# Patient Record
Sex: MALE | Race: WHITE | Employment: UNEMPLOYED | URBAN - METROPOLITAN AREA
[De-identification: names, ages, dates, MRNs, and addresses within clinical notes are randomized per-mention and may not be internally consistent; named-entity substitution may affect disease eponyms.]

---

## 2017-12-03 ENCOUNTER — HOSPITAL ENCOUNTER (INPATIENT)
Age: 27
LOS: 2 days | Discharge: HOME | DRG: 754 | End: 2017-12-06
Attending: EMERGENCY MEDICINE | Admitting: PSYCHIATRY & NEUROLOGY
Payer: MEDICAID

## 2017-12-03 DIAGNOSIS — F39 MOOD DISORDER (HCC): Primary | ICD-10-CM

## 2017-12-03 PROCEDURE — 99285 EMERGENCY DEPT VISIT HI MDM: CPT

## 2017-12-03 RX ORDER — CITALOPRAM 10 MG/1
TABLET ORAL DAILY
Status: ON HOLD | COMMUNITY
End: 2017-12-05 | Stop reason: HOSPADM

## 2017-12-03 ASSESSMENT — ENCOUNTER SYMPTOMS
SHORTNESS OF BREATH: 0
SORE THROAT: 0
EYE DISCHARGE: 0
NAUSEA: 0
RHINORRHEA: 0
COUGH: 0
COLOR CHANGE: 0
DIARRHEA: 0
EYE REDNESS: 0
VOMITING: 0

## 2017-12-03 ASSESSMENT — SLEEP AND FATIGUE QUESTIONNAIRES
AVERAGE NUMBER OF SLEEP HOURS: 8
DO YOU USE A SLEEP AID: NO
DO YOU HAVE DIFFICULTY SLEEPING: NO

## 2017-12-03 ASSESSMENT — LIFESTYLE VARIABLES: HISTORY_ALCOHOL_USE: NO

## 2017-12-04 PROBLEM — F14.10 COCAINE ABUSE (HCC): Status: ACTIVE | Noted: 2017-12-04

## 2017-12-04 PROBLEM — F32.A DEPRESSIVE DISORDER: Status: ACTIVE | Noted: 2017-12-04

## 2017-12-04 LAB
AMPHETAMINE SCREEN URINE: NEGATIVE
BARBITURATE SCREEN URINE: NEGATIVE
BENZODIAZEPINE SCREEN, URINE: NEGATIVE
BUPRENORPHINE URINE: ABNORMAL
CANNABINOID SCREEN URINE: NEGATIVE
COCAINE METABOLITE, URINE: POSITIVE
MDMA URINE: ABNORMAL
METHADONE SCREEN, URINE: NEGATIVE
METHAMPHETAMINE, URINE: ABNORMAL
OPIATES, URINE: NEGATIVE
OXYCODONE SCREEN URINE: NEGATIVE
PHENCYCLIDINE, URINE: NEGATIVE
PROPOXYPHENE, URINE: ABNORMAL
TEST INFORMATION: ABNORMAL
TRICYCLIC ANTIDEPRESSANTS, UR: ABNORMAL

## 2017-12-04 PROCEDURE — 6370000000 HC RX 637 (ALT 250 FOR IP): Performed by: PSYCHIATRY & NEUROLOGY

## 2017-12-04 PROCEDURE — GZ56ZZZ INDIVIDUAL PSYCHOTHERAPY, SUPPORTIVE: ICD-10-PCS | Performed by: PSYCHIATRY & NEUROLOGY

## 2017-12-04 PROCEDURE — GZHZZZZ GROUP PSYCHOTHERAPY: ICD-10-PCS | Performed by: PSYCHIATRY & NEUROLOGY

## 2017-12-04 PROCEDURE — 80307 DRUG TEST PRSMV CHEM ANLYZR: CPT

## 2017-12-04 PROCEDURE — 1240000000 HC EMOTIONAL WELLNESS R&B

## 2017-12-04 RX ORDER — TRAZODONE HYDROCHLORIDE 50 MG/1
50 TABLET ORAL NIGHTLY PRN
Status: DISCONTINUED | OUTPATIENT
Start: 2017-12-04 | End: 2017-12-06 | Stop reason: HOSPADM

## 2017-12-04 RX ORDER — HALOPERIDOL 5 MG/ML
5 INJECTION INTRAMUSCULAR 2 TIMES DAILY PRN
Status: DISCONTINUED | OUTPATIENT
Start: 2017-12-04 | End: 2017-12-06 | Stop reason: HOSPADM

## 2017-12-04 RX ORDER — HYDROXYZINE HYDROCHLORIDE 25 MG/1
50 TABLET, FILM COATED ORAL 3 TIMES DAILY PRN
Status: DISCONTINUED | OUTPATIENT
Start: 2017-12-04 | End: 2017-12-06 | Stop reason: HOSPADM

## 2017-12-04 RX ORDER — BENZTROPINE MESYLATE 1 MG/ML
2 INJECTION INTRAMUSCULAR; INTRAVENOUS DAILY PRN
Status: DISCONTINUED | OUTPATIENT
Start: 2017-12-04 | End: 2017-12-06 | Stop reason: HOSPADM

## 2017-12-04 RX ORDER — MAGNESIUM HYDROXIDE/ALUMINUM HYDROXICE/SIMETHICONE 120; 1200; 1200 MG/30ML; MG/30ML; MG/30ML
30 SUSPENSION ORAL 2 TIMES DAILY PRN
Status: DISCONTINUED | OUTPATIENT
Start: 2017-12-04 | End: 2017-12-06 | Stop reason: HOSPADM

## 2017-12-04 RX ORDER — HALOPERIDOL 5 MG
5 TABLET ORAL 2 TIMES DAILY PRN
Status: DISCONTINUED | OUTPATIENT
Start: 2017-12-04 | End: 2017-12-06 | Stop reason: HOSPADM

## 2017-12-04 RX ORDER — ACETAMINOPHEN 325 MG/1
650 TABLET ORAL EVERY 6 HOURS PRN
Status: DISCONTINUED | OUTPATIENT
Start: 2017-12-04 | End: 2017-12-06 | Stop reason: HOSPADM

## 2017-12-04 RX ORDER — CITALOPRAM 20 MG/1
20 TABLET ORAL DAILY
Status: DISCONTINUED | OUTPATIENT
Start: 2017-12-04 | End: 2017-12-06 | Stop reason: HOSPADM

## 2017-12-04 RX ADMIN — CITALOPRAM HYDROBROMIDE 20 MG: 20 TABLET ORAL at 08:55

## 2017-12-04 ASSESSMENT — SLEEP AND FATIGUE QUESTIONNAIRES
AVERAGE NUMBER OF SLEEP HOURS: 8
DO YOU USE A SLEEP AID: NO
DO YOU HAVE DIFFICULTY SLEEPING: NO

## 2017-12-04 ASSESSMENT — LIFESTYLE VARIABLES
HISTORY_ALCOHOL_USE: YES
HISTORY_ALCOHOL_USE: NO

## 2017-12-04 ASSESSMENT — PATIENT HEALTH QUESTIONNAIRE - PHQ9: SUM OF ALL RESPONSES TO PHQ QUESTIONS 1-9: 11

## 2017-12-04 NOTE — PROGRESS NOTES
Psychiatric Admission Note    Referred by ED       Chief Complaint; Increase in suicidal intentions and unable to contract for safety,    HX of present illness[de-identified]    This 32 yrs old male was admitted for inpatient psychiatric treatment referred by [facility] AN AFFILIATE OF [facility]  (ED) where pt presented with   increase in depression and suicidal ideation. Patient reports that over the last 4 months he has had multiple stressors of losing his job, breaking up with his girlfriend, using cocaine after being clean for 4 years. Patient states that he did not feel safe in returning home because he did not know if he could keep himself safe. Patient states that he has prevent himself from developing a plan. Patient admits to recently stopping steroids and testerone. Patient denies homicidal ideation or history of violence. Patient denies auditory and visual hallucination. Yovani Manzano is a 32 y.o. male who presented with general plan to harm self. . The patient also presents with feelings of being down, depressed or hopelessness. , loss of interest in usual activities. and feelings of guilt, worthlessness or loss of self confidence. mood swings, and no hallucinations were present. Allergies:  Review of patient's allergies indicates no known allergies. History of Substance Abuse:    Patient did  present with substance use, being positive for  cocaine . Past Psychiatric History:     Patient admits to past psychiatric treatment. Family History of psychiatric disorders:    Family history: negative mood disorders. Medical History:     Past Medical History:   Diagnosis Date    Concussion     Murmur         History of Psychiatric Symptoms/Review of Systems:   Teri: no   Panic attacks:  No   Phobias: No   Obsessions and/or Compulsions: No   Involuntary body movements/tics: No   Hallucinations: No   Suicidal admits to  Homicidal denies  Delusions: No   Trauma/PTSD:  No       Mental Status  Pt.  Is
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
## 2017-12-04 NOTE — CARE COORDINATION
Writer attempted to complete initial assessment with pt, however pt was sleeping and stated that she didn't want to be bothered at this time and to come back tomorrow. Writer will attempt to complete at a later time.

## 2017-12-04 NOTE — ED PROVIDER NOTES
display           LABS:  Labs Reviewed - No data to display          EMERGENCY DEPARTMENT COURSE:   Vitals:    Vitals:    12/03/17 2226   BP: (!) 136/56   Pulse: 59   Resp: 16   Temp: 98.3 °F (36.8 °C)   TempSrc: Oral   SpO2: 99%   Weight: 200 lb (90.7 kg)   Height: 6' 1\" (1.854 m)         CRITICAL CARE:      CONSULTS:  None      PROCEDURES:      FINAL IMPRESSION      1. [facility] ([facility]          DISPOSITION/PLAN   DISPOSITION Decision to Admit        PATIENT REFERRED TO:  No follow-up provider specified.     DISCHARGE MEDICATIONS:  New Prescriptions    No medications on file       (Please note that portions of this note were completed with a voice recognition program.  Efforts were made to edit the dictations but occasionally words are mis-transcribed.)    Miko Bee  Emergency Medicine                  Miko Bee MD  12/03/17 6162

## 2017-12-04 NOTE — CARE COORDINATION
BHI Biopsychosocial Assessment    Current Level of Psychosocial Functioning     Independent: x   Dependent:    Minimal Assist:      Comments: Pt states he has been off of his Celexa for three weeks because he doesn't have insurance or money to afford it. Pt states he has been feeling very depressed lately and has thought about suicide, but not acted on it. He states he wanted to get help before he did something he couldn't take back because he didn't trust himself. Psychosocial High Risk Factors (check all that apply)    Unable to obtain meds: x  Chronic illness/pain:     Substance abuse: x  Lack of Family Support:    Financial stress: x  Isolation: x  Inadequate Community Resources:    Suicide attempt(s):    Not taking medications: x   Victim of crime:    Developmental Delay:    Unable to manage personal needs:    Age 72 or older:    Homeless:    No transportation:    Readmission within 30 days:    Unemployment: x  Traumatic Event:    Comments:      Psychiatric Advanced Directives:     None    Family to Involve in Treatment:     Mother and two siblings    Sexual Orientation:     SHADI    Patient Strengths:    Stable housing, support system, linked to [facility]    Patient Barriers:     No legal income, no insurance, not medication compliant, lack of employment    CMHC/mental health history:     [facility], [address] counselor, Jeremy Barajas, and see NP    Plan of Care   medication management, group/individual therapies, family meetings, psycho -education, treatment team meetings to assist with stabilization    Initial Discharge Plan: To become stable on medication, return to mom's house and follow up with [facility] and gain employment      Clinical Summary:      Pt is a 32year old   male who presented to the ED with SI, however pt did not have a plan to harm self. Pt states that he is not medication compliant at this time. Pt is linked to [facility], [address] [place] [address] on file, and does have a CM.  Pt states that he lives with his mother, Bree Phillip, whom is supportive, MICHELLE on file. Pt states that he does not have a form of legal income and recently lost his job . Pt states that  he does have a Hx of AoD use. (no length of sobriety). Pt states that he does not have abuse issues from his past. Pt states that he does not have MI in his family. Pt states that he has no Insurance. Pt states that he has graduated from [employer] [address] and has some college. Pt states that he is currently not having AH, VH and HI, but is having SI and feels hopeless.  Pt states that he has not attempted suicide in the past.

## 2017-12-04 NOTE — BH NOTE
`[facility]  Admission Note     Admission Type:   Admission Type: Voluntary    Reason for admission:  Reason for Admission: Increased depression and suicidal ideation, off meds, abusing testosterone, adderall, cocaine, alcohol    PATIENT STRENGTHS:  Strengths: Communication, Connection to output provider    Patient Strengths and Limitations:  Limitations: Inappropriate/potentially harmful leisure interests    Addictive Behavior:   Addictive Behavior  In the past 3 months, have you felt or has someone told you that you have a problem with:  : (P) None  Do you have a history of Chemical Use?: (P) No  Do you have a history of Alcohol Use?: (P) Yes  Do you have a history of Street Drug Abuse?: (P) Yes  Histroy of Prescripton Drug Abuse?: (P) Yes    Medical Problems:   Past Medical History:   Diagnosis Date    Concussion     Murmur        Status EXAM:  Status and Exam  Normal: No  Facial Expression: Flat  Affect: Blunt  Level of Consciousness: Alert  Mood:Normal: No  Mood: Depressed, Anxious  Motor Activity:Normal: Yes  Interview Behavior: Cooperative  Preception: [place] to Person, Antonetta Shave to Time, [place] to Place, [place] to Situation  Attention:Normal: Yes  Thought Processes: Circumstantial  Thought Content:Normal: Yes  Hallucinations: None  Delusions: No  Memory:Normal: Yes  Insight and Judgment: No  Insight and Judgment: Poor Judgment, Poor Insight  Present Suicidal Ideation: Yes  Present Homicidal Ideation: No    Tobacco Screening:  Practical Counseling, on admission, juan jose X, if applicable and completed (first 3 are required if patient doesn't refuse):             ( x)  Recognizing danger situations (included triggers and roadblocks)                    ( x)  Coping skills (new ways to manage stress, exercise, relaxation techniques, changing routine, distraction)                                                           ( x)  Basic information about quitting (benefits of quitting, techniques in how to quit, available resources  ( ) Referral for counseling faxed to [facility]                                           ( x) Patient refused counseling  ( ) Patient has not smoked in the last 30 days      Pt admitted with followings belongings:  Dentures: None  Vision - Corrective Lenses: Glasses  Hearing Aid: None  Jewelry: None  Body Piercings Removed: N/A  Clothing: Footwear, Jacket / coat, Pants, Shirt, Socks  Were All Patient Medications Collected?: Not Applicable  Other Valuables: Money (Comment), Wallet     Valuables placed in safe in security envelope, number: [id number]. Patient oriented to surroundings and program expectations and copy of patient rights given. Received admission packet: yes. Consents reviewed, signed. Patient verbalized understanding: yes.     Patient education on precautions: yes                  José Miguel Bourne RN

## 2017-12-05 LAB
ABSOLUTE EOS #: 0.1 K/UL (ref 0–0.4)
ABSOLUTE IMMATURE GRANULOCYTE: ABNORMAL K/UL (ref 0–0.3)
ABSOLUTE LYMPH #: 1.9 K/UL (ref 1–4.8)
ABSOLUTE MONO #: 0.6 K/UL (ref 0.1–1.3)
ALBUMIN SERPL-MCNC: 3.4 G/DL (ref 3.5–5.2)
ALBUMIN/GLOBULIN RATIO: ABNORMAL (ref 1–2.5)
ALP BLD-CCNC: 52 U/L (ref 40–129)
ALT SERPL-CCNC: 50 U/L (ref 5–41)
ANION GAP SERPL CALCULATED.3IONS-SCNC: 7 MMOL/L (ref 9–17)
AST SERPL-CCNC: 22 U/L
BASOPHILS # BLD: 1 % (ref 0–2)
BASOPHILS ABSOLUTE: 0 K/UL (ref 0–0.2)
BILIRUB SERPL-MCNC: 0.67 MG/DL (ref 0.3–1.2)
BUN BLDV-MCNC: 14 MG/DL (ref 6–20)
BUN/CREAT BLD: ABNORMAL (ref 9–20)
CALCIUM SERPL-MCNC: 8.8 MG/DL (ref 8.6–10.4)
CHLORIDE BLD-SCNC: 105 MMOL/L (ref 98–107)
CO2: 30 MMOL/L (ref 20–31)
CREAT SERPL-MCNC: 0.98 MG/DL (ref 0.7–1.2)
DIFFERENTIAL TYPE: ABNORMAL
EOSINOPHILS RELATIVE PERCENT: 2 % (ref 0–4)
GFR AFRICAN AMERICAN: >60 ML/MIN
GFR NON-AFRICAN AMERICAN: >60 ML/MIN
GFR SERPL CREATININE-BSD FRML MDRD: ABNORMAL ML/MIN/{1.73_M2}
GFR SERPL CREATININE-BSD FRML MDRD: ABNORMAL ML/MIN/{1.73_M2}
GLUCOSE BLD-MCNC: 100 MG/DL (ref 70–99)
HCT VFR BLD CALC: 40.3 % (ref 41–53)
HEMOGLOBIN: 13.7 G/DL (ref 13.5–17.5)
IMMATURE GRANULOCYTES: ABNORMAL %
LYMPHOCYTES # BLD: 35 % (ref 24–44)
MCH RBC QN AUTO: 28.5 PG (ref 26–34)
MCHC RBC AUTO-ENTMCNC: 34.1 G/DL (ref 31–37)
MCV RBC AUTO: 83.6 FL (ref 80–100)
MONOCYTES # BLD: 10 % (ref 1–7)
PDW BLD-RTO: 13.6 % (ref 11.5–14.9)
PLATELET # BLD: 157 K/UL (ref 150–450)
PLATELET ESTIMATE: ABNORMAL
PMV BLD AUTO: 8.9 FL (ref 6–12)
POTASSIUM SERPL-SCNC: 4.2 MMOL/L (ref 3.7–5.3)
RBC # BLD: 4.82 M/UL (ref 4.5–5.9)
RBC # BLD: ABNORMAL 10*6/UL
SEG NEUTROPHILS: 52 % (ref 36–66)
SEGMENTED NEUTROPHILS ABSOLUTE COUNT: 2.9 K/UL (ref 1.3–9.1)
SODIUM BLD-SCNC: 142 MMOL/L (ref 135–144)
TOTAL PROTEIN: 4.7 G/DL (ref 6.4–8.3)
WBC # BLD: 5.4 K/UL (ref 3.5–11)
WBC # BLD: ABNORMAL 10*3/UL

## 2017-12-05 PROCEDURE — 6370000000 HC RX 637 (ALT 250 FOR IP): Performed by: PSYCHIATRY & NEUROLOGY

## 2017-12-05 PROCEDURE — 80053 COMPREHEN METABOLIC PANEL: CPT

## 2017-12-05 PROCEDURE — 85025 COMPLETE CBC W/AUTO DIFF WBC: CPT

## 2017-12-05 PROCEDURE — 36415 COLL VENOUS BLD VENIPUNCTURE: CPT

## 2017-12-05 PROCEDURE — 1240000000 HC EMOTIONAL WELLNESS R&B

## 2017-12-05 RX ORDER — CITALOPRAM 20 MG/1
20 TABLET ORAL DAILY
Qty: 30 TABLET | Refills: 0 | Status: SHIPPED | OUTPATIENT
Start: 2017-12-05 | End: 2018-07-09

## 2017-12-05 RX ORDER — CITALOPRAM 20 MG/1
20 TABLET ORAL DAILY
Qty: 30 TABLET | Refills: 0 | Status: SHIPPED | OUTPATIENT
Start: 2017-12-05 | End: 2017-12-05

## 2017-12-05 RX ADMIN — CITALOPRAM HYDROBROMIDE 20 MG: 20 TABLET ORAL at 10:01

## 2017-12-05 NOTE — H&P
HISTORY and Treinta LEE ANN Andrea [address]       NAME:  Liban Crow  MRN: [id number]   YOB: 1990   Date: 12/5/2017   Age: 32 y.o. Gender: male     COMPLAINT AND PRESENT HISTORY:      Liban Crow is 32 y.o.,  male, admitted because of depression. Pt has suicidal ideation, denies any plans. Pt denies any homicidal ideation. Pt denies a history of previous suicide attempts. Pt feels hopeless, helpless, worthless, lack of interest, loss of energy. Poor insight. Pt denies having poor sleep, loss of appetite, poor concentration and memory. Pt denies any current auditory, visual or tactile hallucinations. Patients current stressors include housing problems, being evicted, relationship problems, brake-up with girlfriend. Pt has substance abuse, cocaine use daily. Patient denies any current alcohol abuse. Patient will being living at [facility]. Pt has been non compliant with his medications for 3 weeks. DIAGNOSTIC RESULTS   Labs:  CBC:   Recent Labs      12/05/17   0730   WBC  5.4   HGB  13.7   PLT  157     BMP:    Recent Labs      12/05/17   0730   NA  142   K  4.2   CL  105   CO2  30   BUN  14   CREATININE  0.98   GLUCOSE  100*     Hepatic:   Recent Labs      12/05/17   0730   AST  22   ALT  50*   BILITOT  0.67   ALKPHOS  52     PAST MEDICAL HISTORY     Past Medical History:   Diagnosis Date    Concussion     Murmur        Pt denies any history of Diabetes mellitus type 2, hypertension, stroke, heart disease, COPD, Asthma, GERD, HLD, Cancer, Seizures,Thyroid disease, Kidney Disease, Hepatitis, TB.    SURGICAL HISTORY     History reviewed. No pertinent surgical history. FAMILY HISTORY     History reviewed. No pertinent family history.     SOCIAL HISTORY       Social History     Social History    Marital status: Single     Spouse name: N/A    Number of children: N/A    Years of education: N/A     Social History Main Topics    Smoking status: Current Some Day Smoker    Smokeless tobacco: Never Used    Alcohol use Yes      Comment: social     Drug use:      Types: Cocaine      Comment: daily    Sexual activity: Not Asked     Other Topics Concern    None     Social History Narrative    None           REVIEW OF SYSTEMS      No Known Allergies    No current facility-administered medications on file prior to encounter. No current outpatient prescriptions on file prior to encounter. General health:  Fairly good. No fever or chills. Skin:  No itching, redness or rash. Head, eyes, ears, nose, throat:  No headache, epistaxis, rhinorrhea hearing loss or sore throat. Neck:  No pain, stiffness or masses. Cardiovascular/Respiratory system:  No chest pain, palpitation, shortness of breath, coughing or expectoration. Gastrointestinal tract: No abdominal pain, nausea, vomiting, diarrhea or constipation. Genitourinary:  No burning on micturition. No hesitancy, urgency, frequency or discoloration of urine. Locomotor:  No bone or joint pains. No swelling or deformities. Neuropsychiatric:  See HPI. GENERAL PHYSICAL EXAM:     Vitals: /61   Pulse (!) 46   Temp 97.5 °F (36.4 °C) (Oral)   Resp 14   Ht 6' 1\" (1.854 m)   Wt 200 lb (90.7 kg)   SpO2 100%   BMI 26.39 kg/m²  Body mass index is 26.39 kg/m². Pt was examined with a nurse present in the room. GENERAL APPEARANCE:  Liban Crow is 32 y.o.,  male, mildly obese, nourished, conscious, alert. Does not appear to be distress or pain at this time. SKIN:  Warm, dry, no cyanosis or jaundice. HEAD:  Normocephalic, atraumatic, no swelling or tenderness. Opening and closing mouth (CN V). EYES:  Pupils equal, reactive to light, Conjunctiva is clear, EOMs intact [place] (CN II, III, IV, VI), eyelids WNL. EARS:  No discharge, no marked hearing loss (CN VIII).      NOSE:  No rhinorrhea, epistaxis or septal deformity. THROAT:  Not congested. No ulceration bleeding or discharge. NECK:  No stiffness, trachea central.  No palpable masses or L.N.      CHEST:  Symmetrical and equal on expansion. HEART:  Bradycardiac, regular rhythm. S1 > S2, No audible murmurs or gallops. LUNGS:  Equal on expansion, normal breath sounds. No adventitious sounds. ABDOMEN:  Obese. Soft on palpation. No localized tenderness. No guarding or rigidity. No palpable organomegaly. LYMPHATICS:  No palpable lymphadenopathy. LOCOMOTOR, BACK AND SPINE:  No tenderness or deformities. Pt able to rotate head and shrug shoulders (CN XI). EXTREMITIES:  Symmetrical, no pedal edema. Mary Beths sign negative. No discoloration or ulcerations. NEUROLOGIC:  The patient is conscious, alert, oriented, Gait and balance WNL. No apparent focal sensory deficits. No motor deficits, muscle strength equal Oumar. No facial droop (CN VII), tongue protrudes centrally (CN XII), no slurring of the speech (CN X). CN I and IX not tested.             PROVISIONAL DIAGNOSES:      Principal Problem:    Depressive disorder  Active Problems:    Cocaine abuse        Chelsi Smith PA-C on 12/5/2017 at 4:06 PM

## 2017-12-06 VITALS
OXYGEN SATURATION: 100 % | HEIGHT: 73 IN | SYSTOLIC BLOOD PRESSURE: 117 MMHG | TEMPERATURE: 97.8 F | HEART RATE: 49 BPM | BODY MASS INDEX: 26.51 KG/M2 | RESPIRATION RATE: 14 BRPM | DIASTOLIC BLOOD PRESSURE: 70 MMHG | WEIGHT: 200 LBS

## 2017-12-06 PROCEDURE — 6370000000 HC RX 637 (ALT 250 FOR IP): Performed by: PSYCHIATRY & NEUROLOGY

## 2017-12-06 PROCEDURE — 5130000000 HC BRIDGE APPOINTMENT: Performed by: COUNSELOR

## 2017-12-06 RX ADMIN — CITALOPRAM HYDROBROMIDE 20 MG: 20 TABLET ORAL at 08:13

## 2017-12-06 NOTE — CARE COORDINATION
Bridge Appointment completed: Reviewed Discharge Instructions with patient. Patient verbalizes understanding and agreement with the discharge plan using the teachback method.        Discharge Arrangements: to jail house follow up with Roxy Luu notified: n/a PO notified via patient request  Discharge [address] [place] [address], [address] [place], [place] [id number] [phone]  Transported by:  cab

## 2017-12-06 NOTE — PLAN OF CARE
Problem: Altered Mood, Depressive Behavior  Goal: LTG-Able to verbalize and/or display a decrease in depressive symptoms  Outcome: Ongoing  PSYCHOEDUCATION GROUP NOTE    Date: 12/6/17  Start Time: 0900  End Time: 0930    Number Participants in Group:  9/16    Goal:  Patient will demonstrate increased interpersonal interaction   Topic: [employer] and goal setting    Discipline Responsible:   OT  AT  [facility]. x RT MHP Other       Participation Level:     None  Minimal    Active Listener x Interactive    Monopolizing         Participation Quality:  x Appropriate  Inappropriate   x       Attentive        Intrusive   x       Sharing        Resistant          Supportive        Lethargic       Affective:   x Congruent  Incongruent  Blunted  Flat    Constricted  Anxious  Elated  Angry    Labile  Depressed  Other         Cognitive:  x Alert x Oriented PPTP     Concentration  G x F  P   Attention Span  G x F  P   Short-Term Memory  G x F  P   Long-Term Memory  G x F  P   ProblemSolving/  Decision Making  G x F  P   Ability to Process  Information  G x F  P      Contributing Factors             Delusional             Hallucinating             Flight of Ideas             Other:       Modes of Intervention:  x Education x Support x Exploration    Clarifying  Problem Solving  Confrontation   x Socialization x Limit Setting  Reality Testing   x Activity  Movement  Media    Other:            Response to Learning:  x Able to verbalize current knowledge/experience   x Able to verbalize/acknowledge new learning   x Able to retain information   x Capable of insight   x Able to change behavior   x Progressing to goal    Other:        Comments: Pt attended group and participated.
Problem: Altered Mood, Depressive Behavior  Goal: LTG-Able to verbalize and/or display a decrease in depressive symptoms  Outcome: Ongoing  Pt admits to SI no plan, pt is isolative to room for long periods of time. Pt does not attend groups at this time. Pt is flat and aloof of peers. Pt comes out for meals and needs only. Goal: STG-Able to verbalize suicidal ideations  Outcome: Ongoing  Pt admits to having thoughts of self harm with no plan. Agreeable to seeking out staff should feelings increase. Able to identify positive coping skills and plan for safety. Will cont to monitor q15 minutes for safety and provide support and reassurance as needed.
Problem: Altered Mood, Depressive Behavior  Goal: LTG-Able to verbalize and/or display a decrease in depressive symptoms  Outcome: Ongoing  Pt denies depression at this time and states readiness for discharge tomorrow  Goal: STG-Able to verbalize suicidal ideations  Outcome: Met This Shift  Pt denies SI at this time. Pt agrees to seek out staff if they begin having SI or need to talk.  Q15min safety checks continue
Problem: Altered Mood, Depressive Behavior  Goal: LTG-Able to verbalize and/or display a decrease in depressive symptoms  Outcome: Ongoing  Pt denies depression, states readiness for discharge Wednesday. Goal: STG-Able to verbalize suicidal ideations  Outcome: Ongoing  Pt denies SI at this time. Pt agrees to seek out staff if they begin having SI or need to talk.  Q15min safety checks continue
Problem: Altered Mood, Depressive Behavior  Goal: LTG-Able to verbalize and/or display a decrease in depressive symptoms  Outcome: Ongoing  Pt did not attend Community Meeting at 0900 d/t resting in room despite staff invitation to attend.
Problem: Altered Mood, Depressive Behavior  Goal: LTG-Able to verbalize and/or display a decrease in depressive symptoms  Outcome: Ongoing  Pt did not attend Therapeutic Recreation at 1100 d/t resting in room despite staff invitation to attend.
Problem: Altered Mood, Depressive Behavior  Goal: LTG-Able to verbalize and/or display a decrease in depressive symptoms  Outcome: Ongoing  Pt did not attend Therapeutic Recreation at 1100 d/t resting in room despite staff invitation to attend.
Problem: Altered Mood, Depressive Behavior  Goal: LTG-Able to verbalize and/or display a decrease in depressive symptoms  Outcome: Ongoing  Pt did not attend Therapeutic Recreation at 1330 d/t resting in room despite staff invitation to attend.
Problem: Altered Mood, Depressive Behavior  Goal: LTG-Able to verbalize and/or display a decrease in depressive symptoms  Outcome: Ongoing  Pt did not attend socialization and self-reflection group at 1330 despite staff invitation to attend.
Problem: Altered Mood, Depressive Behavior  Goal: LTG-Able to verbalize and/or display a decrease in depressive symptoms  Outcome: Ongoing  Pt did not participate in community meeting/ goals group at 0900 despite staff encouragement to attend.
Problem: Altered Mood, Depressive Behavior  Goal: LTG-Able to verbalize and/or display a decrease in depressive symptoms  Outcome: Ongoing  The patient denies any depression issues during 1:1 conversation. The patient states he feels ready for his coming discharge and feels he will be safe thereupon. Goal: STG-Able to verbalize suicidal ideations  Outcome: Ongoing  The patient denies any thoughts of self harm or suicidal ideation. No such behaviors noted. 15 minute checks maintained.
Problem: Altered Mood, Depressive Behavior  Goal: STG-Able to verbalize suicidal ideations  Outcome: Ongoing  Pt denies thoughts of self harm and is agreeable to seeking out should thoughts of self harm arise. Safe environment maintained. Q15 minute checks for safety cont per unit policy. Will cont to monitor for safety and provides support and reassurance as needed.
Problem: Altered Mood, Depressive Behavior  Intervention: Group therapy participation per treatment plan to increase activity level  Pt declined to attend psychotherapy at 1000 am despite encouragement.   Pt offered 1:1 and refused
Problem: Altered Mood, Depressive Behavior  Intervention: Group therapy participation per treatment plan to increase activity level  Pt declined to attend psychotherapy at 1000 am despite encouragement.   Pt offered 1:1 and refused patient remains isolative to room and aloof from peers
planning    Outcome: needs reinforcement    PATIENT GOALS: to be discussed with patient within 72 hours    PLAN/TREATMENT RECOMMENDATIONS:     continue group therapy , medications compliance, goal setting, individualized assessments and care, continue to monitor pt on unit      SHORT-TERM GOALS:   Time frame for Short-Term Goals: 5-7 days    LONG-TERM GOALS:  Time frame for Long-Term Goals: 6 months  Members Present in Team Meeting: See Arron 20, CTRS  Goal: STG-Able to verbalize suicidal ideations  Outcome: Ongoing

## 2017-12-06 NOTE — PROGRESS NOTES
[facility]  Discharge Note    Pt discharged with followings belongings:   Dentures: None  Vision - Corrective Lenses: Glasses  Hearing Aid: None  Jewelry: None  Body Piercings Removed: N/A  Clothing: Footwear, Jacket / coat, Pants, Shirt, Socks  Were All Patient Medications Collected?: Not Applicable  Other Valuables: Money (Comment), Wallet   Valuables sent home with patient. Valuables retrieved from safe, Security envelope number:  [id number] and returned to patient. Patient left department with Departure Mode: By self, In cab via Mobility at Departure: Ambulatory, discharged to Discharged to: [facility] Rehab.  Patient education on aftercare instructions:   Information faxed to [facility] by [facility] RN Patient verbalize understanding of AVS: States he feels ready for discharge,denies thoughts of suicide,agrees to take medication as prescribed,and keep appointments    Status EXAM upon discharge:  Status and Exam  Normal: Yes  Facial Expression: Brightened  Affect: Appropriate  Level of Consciousness: Alert  Mood:Normal: Yes  Mood: Anxious  Motor Activity:Normal: Yes  Motor Activity: Decreased  Interview Behavior: Cooperative  Preception: [place] to Person, [place] to Time, [place] to Place, [place] to Situation  Attention:Normal: Yes  Thought Processes: Circumstantial  Thought Content:Normal: Yes  Hallucinations: None  Delusions: No  Memory:Normal: Yes  Memory: Poor Recent  Insight and Judgment: Yes  Insight and Judgment: Poor Insight  Present Suicidal Ideation: No  Present Homicidal Ideation: No    Tera Pope RN

## 2017-12-06 NOTE — PROGRESS NOTES
Patient given tobacco quitline number 63934705377 at this time, refusing to call at this time, states \" I just dont want to quit now\"- patient given information as to the dangers of long term tobacco use. Continue to reinforce the importance of tobacco cessation.

## 2017-12-20 NOTE — DISCHARGE SUMMARY
Medications      You can get these medications from any pharmacy    Bring a paper prescription for each of these medications  · citalopram 20 MG tablet             Follow Up: Follow up with [facility]/PCP/PSYCHIATRIST within 2 weeks. Sanjeev Duran MD  [address] [address] [phone]          [address] [address]  [phone]  Go on 12/8/2017  Doug @ 9:00 am and 10:30 am.    [address] [facility] [address]  [phone]  Go on 12/18/2017  Allison Crenshaw NP @ 9:00 am for medication management.       Aliya Van MD  Psychiatrist.

## 2018-07-09 ENCOUNTER — OFFICE VISIT (OUTPATIENT)
Dept: FAMILY MEDICINE CLINIC | Age: 28
End: 2018-07-09
Payer: COMMERCIAL

## 2018-07-09 VITALS
DIASTOLIC BLOOD PRESSURE: 74 MMHG | SYSTOLIC BLOOD PRESSURE: 138 MMHG | OXYGEN SATURATION: 95 % | BODY MASS INDEX: 27.35 KG/M2 | HEART RATE: 67 BPM | WEIGHT: 206.4 LBS | HEIGHT: 73 IN

## 2018-07-09 DIAGNOSIS — R73.9 HYPERGLYCEMIA: Primary | ICD-10-CM

## 2018-07-09 DIAGNOSIS — Z00.00 PREVENTATIVE HEALTH CARE: ICD-10-CM

## 2018-07-09 LAB — HBA1C MFR BLD: 5 %

## 2018-07-09 PROCEDURE — 83036 HEMOGLOBIN GLYCOSYLATED A1C: CPT | Performed by: NURSE PRACTITIONER

## 2018-07-09 PROCEDURE — 99203 OFFICE O/P NEW LOW 30 MIN: CPT | Performed by: NURSE PRACTITIONER

## 2018-07-09 ASSESSMENT — PATIENT HEALTH QUESTIONNAIRE - PHQ9
2. FEELING DOWN, DEPRESSED OR HOPELESS: 0
SUM OF ALL RESPONSES TO PHQ QUESTIONS 1-9: 0
1. LITTLE INTEREST OR PLEASURE IN DOING THINGS: 0
SUM OF ALL RESPONSES TO PHQ9 QUESTIONS 1 & 2: 0

## 2018-07-09 ASSESSMENT — ENCOUNTER SYMPTOMS
ABDOMINAL PAIN: 0
EYE DISCHARGE: 0
SHORTNESS OF BREATH: 0
BLOOD IN STOOL: 0
COUGH: 0

## 2018-07-09 ASSESSMENT — VISUAL ACUITY
OD_CC: 20/10
OS_CC: 20/10

## 2018-07-09 NOTE — PROGRESS NOTES
Visit Information    Have you changed or started any medications since your last visit including any over-the-counter medicines, vitamins, or herbal medicines? no   Are you having any side effects from any of your medications? -  no  Have you stopped taking any of your medications? Is so, why? -  no    Have you seen any other physician or provider since your last visit? No  Have you had any other diagnostic tests since your last visit? No  Have you been seen in the emergency room and/or had an admission to a hospital since we last saw you? No  Have you had your routine dental cleaning in the past 6 months? no    Have you activated your [employer] account? If not, what are your barriers?  Yes     Patient Care Team:  Purnima Arana MD as PCP - General    Medical History Review  Past Medical, Family, and Social History reviewed and does contribute to the patient presenting condition    Health Maintenance   Topic Date Due    HIV screen  01/06/2003    DTaP/Tdap/Td vaccine (1 - Tdap) 01/06/2007    Pneumococcal med risk (1 of 1 - PPSV23) 01/06/2007    Flu vaccine (1) 09/01/2018
Psychiatric/Behavioral: Negative for hallucinations and suicidal ideas. Used adderall only when studying        Objective:     Physical Exam   Constitutional: He is oriented to person, place, and time. He appears well-developed and well-nourished. HENT:   Head: Normocephalic and atraumatic. Eyes: Conjunctivae and EOM are normal.   Neck: Normal range of motion. Neck supple. Carotid bruit is not present. No tracheal deviation present. No thyromegaly present. Cardiovascular: Normal rate, regular rhythm and normal heart sounds. Pulses:       Carotid pulses are 2+ on the right side, and 2+ on the left side. Radial pulses are 2+ on the right side, and 2+ on the left side. Dorsalis pedis pulses are 2+ on the right side, and 2+ on the left side. Pulmonary/Chest: Effort normal and breath sounds normal.   Abdominal: Soft. Bowel sounds are normal.   Lymphadenopathy:     He has no cervical adenopathy. Neurological: He is alert and oriented to person, place, and time. Skin: Skin is warm and dry. Psychiatric: He has a normal mood and affect. Nursing note and vitals reviewed.     /74   Pulse 67   Ht 6' 1\" (1.854 m)   Wt 206 lb 6.4 oz (93.6 kg)   SpO2 95% Comment: rest @ RA  BMI 27.23 kg/m²     CBC:   Lab Results   Component Value Date    WBC 5.4 12/05/2017    RBC 4.82 12/05/2017    HGB 13.7 12/05/2017    HCT 40.3 12/05/2017    MCV 83.6 12/05/2017    MCH 28.5 12/05/2017    MCHC 34.1 12/05/2017    RDW 13.6 12/05/2017     12/05/2017    MPV 8.9 12/05/2017     CMP:    Lab Results   Component Value Date     12/05/2017    K 4.2 12/05/2017     12/05/2017    CO2 30 12/05/2017    BUN 14 12/05/2017    CREATININE 0.98 12/05/2017    GFRAA >60 12/05/2017    LABGLOM >60 12/05/2017    GLUCOSE 100 12/05/2017    PROT 4.7 12/05/2017    LABALBU 3.4 12/05/2017    CALCIUM 8.8 12/05/2017    BILITOT 0.67 12/05/2017    ALKPHOS 52 12/05/2017    AST 22 12/05/2017    ALT 50 12/05/2017

## 2018-07-16 ENCOUNTER — HOSPITAL ENCOUNTER (OUTPATIENT)
Age: 28
Discharge: HOME | End: 2018-07-16
Payer: COMMERCIAL

## 2018-07-16 DIAGNOSIS — Z00.00 PREVENTATIVE HEALTH CARE: ICD-10-CM

## 2018-07-16 PROBLEM — R74.8 ELEVATED LIVER ENZYMES: Status: ACTIVE | Noted: 2018-07-16

## 2018-07-16 PROBLEM — R79.9 ELEVATED BUN: Status: ACTIVE | Noted: 2018-07-16

## 2018-07-16 LAB
ALBUMIN SERPL-MCNC: 4.4 G/DL (ref 3.5–5.2)
ALBUMIN/GLOBULIN RATIO: ABNORMAL (ref 1–2.5)
ALP BLD-CCNC: 78 U/L (ref 40–129)
ALT SERPL-CCNC: 58 U/L (ref 5–41)
ANION GAP SERPL CALCULATED.3IONS-SCNC: 13 MMOL/L (ref 9–17)
AST SERPL-CCNC: 44 U/L
BILIRUB SERPL-MCNC: 0.48 MG/DL (ref 0.3–1.2)
BUN BLDV-MCNC: 25 MG/DL (ref 6–20)
BUN/CREAT BLD: ABNORMAL (ref 9–20)
CALCIUM SERPL-MCNC: 9.4 MG/DL (ref 8.6–10.4)
CHLORIDE BLD-SCNC: 103 MMOL/L (ref 98–107)
CHOLESTEROL, FASTING: 147 MG/DL
CHOLESTEROL/HDL RATIO: 7.4
CO2: 25 MMOL/L (ref 20–31)
CREAT SERPL-MCNC: 0.91 MG/DL (ref 0.7–1.2)
GFR AFRICAN AMERICAN: >60 ML/MIN
GFR NON-AFRICAN AMERICAN: >60 ML/MIN
GFR SERPL CREATININE-BSD FRML MDRD: ABNORMAL ML/MIN/{1.73_M2}
GFR SERPL CREATININE-BSD FRML MDRD: ABNORMAL ML/MIN/{1.73_M2}
GLUCOSE BLD-MCNC: 99 MG/DL (ref 70–99)
HDLC SERPL-MCNC: 20 MG/DL
HIV AG/AB: NONREACTIVE
LDL CHOLESTEROL: 115 MG/DL (ref 0–130)
POTASSIUM SERPL-SCNC: 4.5 MMOL/L (ref 3.7–5.3)
SODIUM BLD-SCNC: 141 MMOL/L (ref 135–144)
TOTAL PROTEIN: 6.8 G/DL (ref 6.4–8.3)
TRIGLYCERIDE, FASTING: 61 MG/DL
VLDLC SERPL CALC-MCNC: ABNORMAL MG/DL (ref 1–30)

## 2018-07-16 PROCEDURE — 36415 COLL VENOUS BLD VENIPUNCTURE: CPT

## 2018-07-16 PROCEDURE — 87389 HIV-1 AG W/HIV-1&-2 AB AG IA: CPT

## 2018-07-16 PROCEDURE — 80061 LIPID PANEL: CPT

## 2018-07-16 PROCEDURE — 80053 COMPREHEN METABOLIC PANEL: CPT

## 2019-04-09 ENCOUNTER — APPOINTMENT (OUTPATIENT)
Dept: GENERAL RADIOLOGY | Age: 29
End: 2019-04-09
Payer: MEDICARE

## 2019-04-09 ENCOUNTER — HOSPITAL ENCOUNTER (OUTPATIENT)
Age: 29
Setting detail: OBSERVATION
Discharge: HOME | End: 2019-04-10
Attending: EMERGENCY MEDICINE | Admitting: INTERNAL MEDICINE
Payer: MEDICARE

## 2019-04-09 DIAGNOSIS — R77.8 ELEVATED TROPONIN: ICD-10-CM

## 2019-04-09 DIAGNOSIS — R79.89 ELEVATED LFTS: ICD-10-CM

## 2019-04-09 DIAGNOSIS — R45.851 SUICIDAL IDEATION: Primary | ICD-10-CM

## 2019-04-09 DIAGNOSIS — F14.10 COCAINE ABUSE (HCC): ICD-10-CM

## 2019-04-09 LAB
ABSOLUTE EOS #: 0 K/UL (ref 0–0.4)
ABSOLUTE IMMATURE GRANULOCYTE: ABNORMAL K/UL (ref 0–0.3)
ABSOLUTE LYMPH #: 1.4 K/UL (ref 1–4.8)
ABSOLUTE MONO #: 0.9 K/UL (ref 0.1–1.3)
ACETAMINOPHEN LEVEL: <5 UG/ML (ref 10–30)
ALBUMIN SERPL-MCNC: 4.4 G/DL (ref 3.5–5.2)
ALBUMIN/GLOBULIN RATIO: ABNORMAL (ref 1–2.5)
ALP BLD-CCNC: 45 U/L (ref 40–129)
ALT SERPL-CCNC: 125 U/L (ref 5–41)
AMPHETAMINE SCREEN URINE: NEGATIVE
ANION GAP SERPL CALCULATED.3IONS-SCNC: 14 MMOL/L (ref 9–17)
AST SERPL-CCNC: 240 U/L
BARBITURATE SCREEN URINE: NEGATIVE
BASOPHILS # BLD: 2 % (ref 0–2)
BASOPHILS ABSOLUTE: 0.1 K/UL (ref 0–0.2)
BENZODIAZEPINE SCREEN, URINE: NEGATIVE
BILIRUB SERPL-MCNC: 0.84 MG/DL (ref 0.3–1.2)
BUN BLDV-MCNC: 21 MG/DL (ref 6–20)
BUN/CREAT BLD: ABNORMAL (ref 9–20)
BUPRENORPHINE URINE: ABNORMAL
CALCIUM SERPL-MCNC: 8.9 MG/DL (ref 8.6–10.4)
CANNABINOID SCREEN URINE: NEGATIVE
CHLORIDE BLD-SCNC: 99 MMOL/L (ref 98–107)
CO2: 26 MMOL/L (ref 20–31)
COCAINE METABOLITE, URINE: POSITIVE
CREAT SERPL-MCNC: 0.85 MG/DL (ref 0.7–1.2)
DIFFERENTIAL TYPE: ABNORMAL
EOSINOPHILS RELATIVE PERCENT: 0 % (ref 0–4)
ETHANOL PERCENT: <0.01 %
ETHANOL: <10 MG/DL
GFR AFRICAN AMERICAN: >60 ML/MIN
GFR NON-AFRICAN AMERICAN: >60 ML/MIN
GFR SERPL CREATININE-BSD FRML MDRD: ABNORMAL ML/MIN/{1.73_M2}
GFR SERPL CREATININE-BSD FRML MDRD: ABNORMAL ML/MIN/{1.73_M2}
GLUCOSE BLD-MCNC: 108 MG/DL (ref 70–99)
HCT VFR BLD CALC: 47.5 % (ref 41–53)
HEMOGLOBIN: 16 G/DL (ref 13.5–17.5)
IMMATURE GRANULOCYTES: ABNORMAL %
LYMPHOCYTES # BLD: 17 % (ref 24–44)
MCH RBC QN AUTO: 26.9 PG (ref 26–34)
MCHC RBC AUTO-ENTMCNC: 33.7 G/DL (ref 31–37)
MCV RBC AUTO: 79.9 FL (ref 80–100)
MDMA URINE: ABNORMAL
METHADONE SCREEN, URINE: NEGATIVE
METHAMPHETAMINE, URINE: ABNORMAL
MONOCYTES # BLD: 11 % (ref 1–7)
NRBC AUTOMATED: ABNORMAL PER 100 WBC
OPIATES, URINE: NEGATIVE
OXYCODONE SCREEN URINE: NEGATIVE
PDW BLD-RTO: 17.3 % (ref 11.5–14.9)
PHENCYCLIDINE, URINE: NEGATIVE
PLATELET # BLD: 260 K/UL (ref 150–450)
PLATELET ESTIMATE: ABNORMAL
PMV BLD AUTO: 8.7 FL (ref 6–12)
POTASSIUM SERPL-SCNC: 4.3 MMOL/L (ref 3.7–5.3)
PROPOXYPHENE, URINE: ABNORMAL
RBC # BLD: 5.95 M/UL (ref 4.5–5.9)
RBC # BLD: ABNORMAL 10*6/UL
SALICYLATE LEVEL: <1 MG/DL (ref 3–10)
SEG NEUTROPHILS: 70 % (ref 36–66)
SEGMENTED NEUTROPHILS ABSOLUTE COUNT: 5.7 K/UL (ref 1.3–9.1)
SODIUM BLD-SCNC: 139 MMOL/L (ref 135–144)
TEST INFORMATION: ABNORMAL
TOTAL PROTEIN: 6.5 G/DL (ref 6.4–8.3)
TRICYCLIC ANTIDEPRESSANTS, UR: ABNORMAL
TROPONIN INTERP: ABNORMAL
TROPONIN INTERP: ABNORMAL
TROPONIN T: ABNORMAL NG/ML
TROPONIN T: ABNORMAL NG/ML
TROPONIN, HIGH SENSITIVITY: 33 NG/L (ref 0–22)
TROPONIN, HIGH SENSITIVITY: 33 NG/L (ref 0–22)
WBC # BLD: 8.2 K/UL (ref 3.5–11)
WBC # BLD: ABNORMAL 10*3/UL

## 2019-04-09 PROCEDURE — 80053 COMPREHEN METABOLIC PANEL: CPT

## 2019-04-09 PROCEDURE — 80307 DRUG TEST PRSMV CHEM ANLYZR: CPT

## 2019-04-09 PROCEDURE — 71045 X-RAY EXAM CHEST 1 VIEW: CPT

## 2019-04-09 PROCEDURE — 99285 EMERGENCY DEPT VISIT HI MDM: CPT

## 2019-04-09 PROCEDURE — 85025 COMPLETE CBC W/AUTO DIFF WBC: CPT

## 2019-04-09 PROCEDURE — 84484 ASSAY OF TROPONIN QUANT: CPT

## 2019-04-09 PROCEDURE — 36415 COLL VENOUS BLD VENIPUNCTURE: CPT

## 2019-04-09 PROCEDURE — 6370000000 HC RX 637 (ALT 250 FOR IP): Performed by: EMERGENCY MEDICINE

## 2019-04-09 PROCEDURE — G0480 DRUG TEST DEF 1-7 CLASSES: HCPCS

## 2019-04-09 PROCEDURE — G0378 HOSPITAL OBSERVATION PER HR: HCPCS

## 2019-04-09 RX ORDER — SODIUM CHLORIDE 0.9 % (FLUSH) 0.9 %
10 SYRINGE (ML) INJECTION PRN
Status: DISCONTINUED | OUTPATIENT
Start: 2019-04-09 | End: 2019-04-10 | Stop reason: HOSPADM

## 2019-04-09 RX ORDER — LORAZEPAM 1 MG/1
1 TABLET ORAL ONCE
Status: COMPLETED | OUTPATIENT
Start: 2019-04-09 | End: 2019-04-09

## 2019-04-09 RX ORDER — ASPIRIN 81 MG/1
324 TABLET, CHEWABLE ORAL ONCE
Status: COMPLETED | OUTPATIENT
Start: 2019-04-09 | End: 2019-04-09

## 2019-04-09 RX ORDER — SODIUM CHLORIDE 0.9 % (FLUSH) 0.9 %
10 SYRINGE (ML) INJECTION EVERY 12 HOURS SCHEDULED
Status: DISCONTINUED | OUTPATIENT
Start: 2019-04-09 | End: 2019-04-10 | Stop reason: HOSPADM

## 2019-04-09 RX ORDER — SODIUM CHLORIDE 9 MG/ML
INJECTION, SOLUTION INTRAVENOUS CONTINUOUS
Status: DISCONTINUED | OUTPATIENT
Start: 2019-04-09 | End: 2019-04-10 | Stop reason: HOSPADM

## 2019-04-09 RX ORDER — ONDANSETRON 2 MG/ML
4 INJECTION INTRAMUSCULAR; INTRAVENOUS EVERY 6 HOURS PRN
Status: DISCONTINUED | OUTPATIENT
Start: 2019-04-09 | End: 2019-04-10 | Stop reason: HOSPADM

## 2019-04-09 RX ADMIN — LORAZEPAM 1 MG: 1 TABLET ORAL at 20:22

## 2019-04-09 RX ADMIN — ASPIRIN 81 MG 324 MG: 81 TABLET ORAL at 21:28

## 2019-04-09 ASSESSMENT — ENCOUNTER SYMPTOMS
TROUBLE SWALLOWING: 0
COUGH: 0
CONSTIPATION: 0
SORE THROAT: 0
DIARRHEA: 0
COLOR CHANGE: 0
BACK PAIN: 0
SHORTNESS OF BREATH: 0
VOMITING: 0
NAUSEA: 0
ABDOMINAL PAIN: 0
BLOOD IN STOOL: 0

## 2019-04-10 ENCOUNTER — APPOINTMENT (OUTPATIENT)
Dept: ULTRASOUND IMAGING | Age: 29
End: 2019-04-10
Payer: MEDICARE

## 2019-04-10 VITALS
WEIGHT: 200 LBS | HEIGHT: 73 IN | TEMPERATURE: 97.5 F | SYSTOLIC BLOOD PRESSURE: 121 MMHG | DIASTOLIC BLOOD PRESSURE: 57 MMHG | BODY MASS INDEX: 26.51 KG/M2 | HEART RATE: 92 BPM | RESPIRATION RATE: 16 BRPM | OXYGEN SATURATION: 98 %

## 2019-04-10 PROCEDURE — G0378 HOSPITAL OBSERVATION PER HR: HCPCS

## 2019-04-10 PROCEDURE — 90792 PSYCH DIAG EVAL W/MED SRVCS: CPT | Performed by: NURSE PRACTITIONER

## 2019-04-10 PROCEDURE — 99220 PR INITIAL OBSERVATION CARE/DAY 70 MINUTES: CPT | Performed by: INTERNAL MEDICINE

## 2019-04-10 PROCEDURE — 2580000003 HC RX 258: Performed by: INTERNAL MEDICINE

## 2019-04-10 PROCEDURE — 76705 ECHO EXAM OF ABDOMEN: CPT

## 2019-04-10 PROCEDURE — 93005 ELECTROCARDIOGRAM TRACING: CPT

## 2019-04-10 RX ADMIN — SODIUM CHLORIDE: 9 INJECTION, SOLUTION INTRAVENOUS at 04:08

## 2019-04-10 RX ADMIN — SODIUM CHLORIDE: 9 INJECTION, SOLUTION INTRAVENOUS at 13:54

## 2019-04-10 NOTE — FLOWSHEET NOTE
04/10/19 1410   Encounter Summary   Services provided to: Patient   Referral/Consult From: Rounding   Continue Visiting   (4/10/19)   Complexity of Encounter Low   Length of Encounter 15 minutes   Routine   Type Initial   Spiritual/Yazdanism   Type Ritual   Intervention Anointing   Sacraments   Sacrament of Sick-Anointing Anointed  (Fr Munroe 4/10/19)

## 2019-04-10 NOTE — ED PROVIDER NOTES
[address] ED  eMERGENCY dEPARTMENT eNCOUnter    Pt Name: Valery Pearce  MRN: [id number]  YOB: 1990  Date of evaluation:4/9/19  PCP: AGNIE Ibarra CNP    CHIEF COMPLAINT       Chief Complaint   Patient presents with    Suicidal       HISTORY OF PRESENT ILLNESS    Valery Pearce is a 30 y.o. male who presents with a chief complaint of suicidal ideations. Patient states he has been on a 7 day \"binge\" which has consisted of significant cocaine use. He states he feels suicidal and very depressed. His plan is to overdose on alcohol and cocaine. He has not actually tried to hurt himself by using cocaine over the past week but is concerned he is going to do so. No homicidal ideations. He denies any chest pain, shortness breath or palpitations right now but states that he frequently gets palpitations after using cocaine. He states that last night his chest was pounding and he was having some chest pain but has not had any chest pain today. Last cocaine use was about 3 hours ago. Denies any other drug use. He does acknowledge a history of a heart murmur. He has no other heart problems. No recent fevers, chills or other illnesses. Symptoms are acute. Symptoms are severe. Nothing makes symptoms better but his drug use is making his symptoms worse. Patient has no other complaints at this time. REVIEW OF SYSTEMS       Review of Systems   Constitutional: Negative for chills, fatigue and fever. HENT: Negative for congestion, ear pain, sore throat and trouble swallowing. Eyes: Negative for visual disturbance. Respiratory: Negative for cough and shortness of breath. Cardiovascular: Positive for palpitations. Negative for chest pain and leg swelling. Gastrointestinal: Negative for abdominal pain, blood in stool, constipation, diarrhea, nausea and vomiting. Genitourinary: Negative for dysuria and flank pain.    Musculoskeletal: Negative for arthralgias, back pain, myalgias and neck pain. Skin: Negative for color change, rash and wound. Neurological: Negative for dizziness, weakness, light-headedness, numbness and headaches. Psychiatric/Behavioral: Positive for suicidal ideas. Negative for confusion. All other systems reviewed and are negative. Negativein 10 essential Systems except as mentioned above and in the HPI. PAST MEDICAL HISTORY     Past Medical History:   Diagnosis Date    ADD (attention deficit disorder)     not sure of diag, was on adderall in college, then approx 6 mos util 3/2017    [facility] ([facility] [address]     Concussion     Depressive disorder     was put on med at time hospitalization, [facility] psych, took celexa x sev wks only.  Murmur          SURGICAL HISTORY      has no past surgical history on file. None    CURRENT MEDICATIONS       Previous Medications    MULTIPLE VITAMIN (MULTI VITAMIN DAILY PO)    Take 1 capsule by mouth       ALLERGIES     has No Known Allergies. FAMILY HISTORY     indicated that his mother is alive. He indicated that his father is . family history includes Cancer in his father. SOCIAL HISTORY      reports that he has quit smoking. He has a 0.75 pack-year smoking history. He has never used smokeless tobacco. He reports that he drank alcohol. He reports that he has current or past drug history. Drug: Cocaine. PHYSICAL EXAM     INITIAL VITALS:  height is 6' 1\" (1.854 m) and weight is 200 lb (90.7 kg). His temperature is 98.7 °F (37.1 °C). His blood pressure is 115/76 and his pulse is 103. His respiration is 16 and oxygen saturation is 95%. Physical Exam   Constitutional: He is oriented to person, place, and time. No distress. HENT:   Head: Normocephalic and atraumatic. Eyes: Pupils are equal, round, and reactive to light. Conjunctivae are normal.   Neck: Neck supple. Cardiovascular: Regular rhythm and intact distal pulses. Tachycardia present. Murmur heard.   Pulmonary/Chest: Effort normal and breath sounds normal. No respiratory distress. Abdominal: Soft. Bowel sounds are normal. He exhibits no distension. There is no tenderness. Musculoskeletal: He exhibits no edema or tenderness. Lymphadenopathy:     He has no cervical adenopathy. Neurological: He is alert and oriented to person, place, and time. Skin: Skin is warm and dry. No rash noted. Psychiatric: Judgment normal.   Nursing note and vitals reviewed. DIFFERENTIAL DIAGNOSIS/MDM:   77-year-old male presents with suicidal ideations with plan to overdose on cocaine and alcohol. He is afebrile and nontoxic. Vital signs are normal other than mild tachycardia. I suspect symptoms are secondary to his significant cocaine use and abuse. I have a low suspicion for ACS however patient was having some chest pain and palpitations last night after cocaine use or I am going to get a cardiac workup. He doesn't heart murmur however this is old. I have a low suspicion for an infectious process. He is not using IV drugs. Will get cardiac workup including EKG, chest x-ray, troponin ×1, CBC, BMP. DIAGNOSTIC RESULTS     EKG: All EKG's are interpreted by the Emergency Department Physician who either signs or Co-signs this chart in the absence of a cardiologist.    EKG Interpretation    Interpreted by emergency department physician at 8:41 PM.      Rhythm: normal sinus   Rate: normal  Axis: normal  Ectopy: none  Conduction: nonspecific  ST Segments: nonspecific changes  T Waves: non specific changes  Q Waves: nonspecific    EKG  Impression: non-specific EKG and left ventricular hypertrophy      RADIOLOGY:   I directly visualized the following  images and reviewed the radiologist interpretations:  XR CHEST PORTABLE   Final Result   No acute cardiopulmonary disease.                  ED BEDSIDE ULTRASOUND:      LABS:  Labs Reviewed   CBC WITH AUTO DIFFERENTIAL - Abnormal; Notable for the following components:       Result Value RBC 5.95 (*)     MCV 79.9 (*)     RDW 17.3 (*)     Seg Neutrophils 70 (*)     Lymphocytes 17 (*)     Monocytes 11 (*)     All other components within normal limits   ACETAMINOPHEN LEVEL - Abnormal; Notable for the following components:    Acetaminophen Level <5 (*)     All other components within normal limits   SALICYLATE LEVEL - Abnormal; Notable for the following components:    Salicylate Lvl <1 (*)     All other components within normal limits   COMPREHENSIVE METABOLIC PANEL - Abnormal; Notable for the following components:    Glucose 108 (*)     BUN 21 (*)      (*)      (*)     All other components within normal limits   TROPONIN - Abnormal; Notable for the following components:    Troponin, High Sensitivity 33 (*)     All other components within normal limits   ETHANOL   URINE DRUG SCREEN         EMERGENCY DEPARTMENT COURSE:   Vitals:    Vitals:    04/09/19 1946   BP: 115/76   Pulse: 103   Resp: 16   Temp: 98.7 °F (37.1 °C)   SpO2: 95%   Weight: 200 lb (90.7 kg)   Height: 6' 1\" (1.854 m)     9:14 PM  On patient's labwork is found to have mild elevation of his troponins at 33. With his recent history of extensive cocaine use and recent chest pain I am going to admit for cardiac rule out. He confirms that he is not having any chest pain at this time. We did give aspirin and Ativan. I spoke with Dr. Barbie Garcia accepted admission. Also having mildly elevated LFTs however he is not having any abdominal pain, nausea or vomiting. We'll order a right upper quadrant ultrasound for the morning. CRITICALCARE:      CONSULTS:  IP CONSULT TO INTERNAL MEDICINE      PROCEDURES:      FINAL IMPRESSION      1. Suicidal ideation    2. Cocaine abuse (HCC)    3. Elevated troponin    4.  Elevated LFTs            DISPOSITION/PLAN   DISPOSITION      Admission    PATIENT REFERRED TO:  Dieudonne Samuel, APRN - CNP  [address] [facility]  [facility]  [address] MEDICATIONS:  New Prescriptions    No medications on file       (Please note that portions ofthis note were completed with a voice recognition program.  Efforts were made to edit the dictations but occasionally words are mis-transcribed.)    William Santos DO  Attending Emergency Physician          William Santos DO  04/09/19 3673

## 2019-04-10 NOTE — ED NOTES
2218: Writer called to give report at this time. RN unavailable and will call writer back.       Vikas Taylor RN  04/09/19 3901

## 2019-04-10 NOTE — CARE COORDINATION
CASE MANAGEMENT NOTE:    Admission Date:  4/9/2019 Kerri Garay is a 30 y.o.  male    Admitted for : Cocaine abuse ([facility] [address] [F14.10]    Met with:  Patient    PCP:  Luis Felipe Bro CNP                                Insurance:  [employer]      Current Residence/ Living Arrangements:  independently at home             Current Services PTA:  No    Is patient agreeable to VNS: No    Freedom of choice provided: Yes    List of [address] provided: Yes    VNS chosen:  No    DME:  none    Home Oxygen: No    Nebulizer: No    CPAP/BIPAP: No    Supplier: N/A    Potential Assistance Needed: No    SNF needed: No    Pharmacy:  [facility]       Is the Patient an [place] with Readmission Risk Score greater than 14%? No  If yes, pt needs a follow up appointment made within 7 days. Does Patient want to use MEDS to BEDS? No    Family Members/Caregivers that pt would like involved in their care:    Yes    If yes, list name here:  Yael    Transportation Provider:  Patient             Is patient in Isolation/One on One/Altered Mental Status? Yes  If yes, skip next question. If no, would they like an I-Pad to  use? NA  If yes, call [phone]. Discharge Plan:  4/10: [employer] - Patient is from 2-story home with yael. He is independent with his care and drives. DME - none. Declines any discharge needs. Had [facility] and will have psych consult. Guard at bedside. Will follow.  //KAILEE                   Electronically signed by: Lizy Aaron RN on 4/10/2019 at 11:03 AM

## 2019-04-10 NOTE — H&P
[address]    Date:   4/10/2019  Patient name:  Ahsna Strong  Date of admission:  4/9/2019  7:17 PM  MRN:   [id number]  YOB: 1990      Cc suicidal     HPI   Pt admitted with sympts of depression and suicidal         HPI   1) Location/Symptom suicidal   2) Timing/Onset: 1 day   3) Context/Setting: binge drinking cocaine use   4) Quality: no chest pain or sob   5) Duration: continuous   6) Modifying Factors:   7) Severity: moderate       Past Medical History:   Diagnosis Date    ADD (attention deficit disorder)     not sure of diag, was on adderall in college, then approx 6 mos util 3/2017    Cocaine abuse ([facility] [address]     Concussion     Depressive disorder     was put on med at time hospitalization, [facility], took celexa x sev wks only.  Murmur      Family History   Problem Relation Age of Onset    Cancer Father         bone       reports that he has quit smoking. He has a 0.75 pack-year smoking history. He has never used smokeless tobacco. He reports that he drank alcohol. He reports that he has current or past drug history. Drug: Cocaine. Past Medical History:   Diagnosis Date    ADD (attention deficit disorder)     not sure of diag, was on adderall in college, then approx 6 mos util 3/2017    Cocaine abuse ([facility] [address]     Concussion     Depressive disorder     was put on med at time hospitalization, [facility], took celexa x sev wks only.  Murmur      No Known Allergies    Review of systems    Constitutional: Negative for fever and fatigue. Respiratory: Negative for cough and shortness of breath. Cardiovascular: Negative for chest pain, palpitations and leg swelling. Gastrointestinal: Negative for abdominal pain, diarrhea and blood in stool. Endocrine: Negative for cold intolerance. Genitourinary: Negative for dysuria and frequency. Musculoskeletal: Negative for back pain and arthralgias.    Skin: Negative for color change and rash. Neurological: Negative for dizziness and headaches. Psychiatric/Behavioral: Negative for confusion. ROS  Physical exam     BP (!) 125/52   Pulse 60   Temp 97.9 °F (36.6 °C) (Oral)   Resp 16   Ht 6' 1\" (1.854 m)   Wt 200 lb (90.7 kg)   SpO2 99%   BMI 26.39 kg/m²      General appearance: well nourished in no distress  Eyes:  ABIGAIL   Head: AT/NC  ENT NAD   Neck: Trachea midline; supple  Lungs: normal effort, clear to auscultation. CVS sinus with no murmurs. Vasc No JVP, no carotid bruit  Abdomen: Soft, non-tender; no masses or HSM,   Extremities: no edema; no digital cyanosis or clubbing. Musculoskeletal NO joint effusion or synovitis  Skin: No rash or ulcers  Neurologic: Cranial nerves II-XII grossly intact; no motor deficit. Psych: depressed  NO lymphadenopathy            Relevant Labs/Imaging     CBC:   Recent Labs     04/09/19 2020   WBC 8.2   HGB 16.0        BMP:    Recent Labs     04/09/19 2020      K 4.3   CL 99   CO2 26   BUN 21*   CREATININE 0.85   GLUCOSE 108*     S. Calcium:  Recent Labs     04/09/19 2020   CALCIUM 8.9     Glycosylated hemoglobin A1C: No results for input(s): LABA1C in the last 72 hours. BNP:No results for input(s): BNP in the last 72 hours. Assessment / Plan      Patient Active Problem List   Diagnosis    Depressive disorder    Cocaine abuse ([facility] [address]    Elevated BUN    Elevated liver enzymes       A/P  Pt with hx of etoh and cocaine use with chest pain   Trop nonspecific 33 delta 0   Now no chest pain   bp well controlled  Medically stable for stable       MD DAY Proctor [address], [address] [place] [address].    Phone [phone]   Fax: [phone]  Answering Service: [phone]

## 2019-04-10 NOTE — ED NOTES
2236: Report called to Jessica Almaguer. Pt being admitted to PCU room number 2105. Writer updated in regards to issues that brought the Pt into the ED, diagnosis, suicide precautions, assessment, VS's, MEWs, Labs, INT and meds given. All questions answered.         Aj Sosa RN  04/09/19 1926

## 2019-04-10 NOTE — VIRTUAL HEALTH
Inpatient consult to Psychiatry  Consult performed by: ANGIE Kennedy - CNP  Consult ordered by: Neva Velásquez MD          Department of Psychiatry  Consult Service  Attending Physician Psychiatric Assessment      Thank you very much for allowing us to participate in the care of this patient. Reason for Consult:  Suicidal ideations, cocaine abuse    History obtained from:  patient, electronic medical record    HISTORY OF PRESENT ILLNESS:          The patient is a 30 y.o. male with significant past medical history of   Past Medical History:        Diagnosis Date    ADD (attention deficit disorder)     not sure of diag, was on adderall in college, then approx 6 mos util 3/2017    Cocaine abuse ([facility] [address]     Concussion     Depressive disorder     was put on med at time hospitalization, [facility], took celexa x sev wks only.  Murmur      Per ED records, client reported he had been on a 7 day \"binge\" which has consisted of significant cocaine use. He states he feels suicidal and very depressed. He verbalized a plan to overdose on alcohol and cocaine. He stated that he had some heart palpitations and chest pain the day prior and acknowledged a history of a heart murmur. last night his chest was pounding and he was having some chest pain but has not had any chest pain today. He has not actually tried to hurt himself by using cocaine over the past week but is concerned he is going to do so. He does ED records:   No homicidal ideations. He denies any chest pain, shortness breath or palpitations right now but states that he frequently gets palpitations after using cocaine. Last cocaine use was about 3 hours ago. Denies any other drug use. Shima Hobby He has no other heart problems. No recent fevers, chills or other illnesses. Symptoms are acute. Symptoms are severe. Nothing makes symptoms better but his drug use is making his symptoms worse. Patient has no other complaints at this time. strain: None    Food insecurity:     Worry: None     Inability: None    Transportation needs:     Medical: None     Non-medical: None   Tobacco Use    Smoking status: Former Smoker     Packs/day: 0.25     Years: 3.00     Pack years: 0.75    Smokeless tobacco: Never Used   Substance and Sexual Activity    Alcohol use: Not Currently    Drug use: Yes     Types: Cocaine     Comment: quit 1/2018 4/9/19: Pt has used crack cocaine for the past 3 days. Pt started about 2 months ago.      Sexual activity: Yes     Partners: Female   Lifestyle    Physical activity:     Days per week: None     Minutes per session: None    Stress: None   Relationships    Social connections:     Talks on phone: None     Gets together: None     Attends Hoahaoism service: None     Active member of club or organization: None     Attends meetings of clubs or organizations: None     Relationship status: None    Intimate partner violence:     Fear of current or ex partner: None     Emotionally abused: None     Physically abused: None     Forced sexual activity: None   Other Topics Concern    None   Social History Narrative    None       Family Psychiatric History:  Denies    Family History:       Problem Relation Age of Onset    Cancer Father         bone          Physical  BP (!) 125/52   Pulse 60   Temp 97.9 °F (36.6 °C) (Oral)   Resp 16   Ht 6' 1\" (1.854 m)   Wt 200 lb (90.7 kg)   SpO2 99%   BMI 26.39 kg/m²     MENTAL STATUS EXAM:  Level of consciousness:  within normal limits  Appearance:  hospital attire, seated in bed and good grooming  Behavior/Motor:  no abnormalities noted  Attitude toward examiner:  cooperative  Speech:  spontaneous  Mood:  anxious  Affect:  mood congruent  Thought processes:  goal directed  Thought content:  Homocidal ideation denies  Suicidal Ideation:  denies suicidal ideation  Delusions:  no evidence of delusions  Perceptual Disturbance:  denies any perceptual disturbance  Cognition:  oriented to person, place, and time  Memory: intact  Insight & Judgement: fair   Medication side effects: denies         Patient Active Problem List   Diagnosis Code    Depressive disorder F32.9    Cocaine abuse ([facility] [address] F14.10    Elevated BUN R79.9    Elevated liver enzymes R74.8       DSM-V DIAGNOSIS:      Impression    Client reports that he recently relapsed on cocaine/alcohol x1.5 months after completing probation. Three years of sobriety prior to this. He was due to start IOP at [facility] today but states he decide to come into the ED yesterday because he felt he could not wait for help. Reports being on a binge for 5-7 days prior to hospitalization. Acknowledged feelings of guilt but states he verbalized suicidal thoughts because he knew it would get him into the hospital.  States he felt he needed a safe place for the night r/t substance use with the hopes of starting IOP the next day. Jessica Chahal denied a history of depression but was hospitalized in Dec 2017 for depression and suicidal ideations. He was discharged on celexa but only took for a few weeks and did not follow up with [facility] discharge appointment. He denies suicidal and homicidal ideation. He does acknowledge feeling more fatigued recently but reports feelings of optimism, enjoyment in activity, and stable mood prior to relapse. Jessica Chahal does appear to be minimizing substance use and mental health history. He appears anxious and concerned about his health. He is goal oriented with a self made appointment at [facility] for IOP (missed today's initial appointment). Recommendations:      - Client does not appear to be at risk of harming self or others at this time and suicide precautions may be discontinued.   - Recommend SW consult to follow up with [facility] for IOP as client missed his initial appointment and may need rescheduled  - Recommend outpatient psychiatric services as well r/t history but client does not appear to have interest at this time. Thank you very much for allowing us to participate in the care of this patient. Time spent > 60 min. Physicians Signature:  Electronically signed by Lenda Osler, APRN - CNP on 4/10/2019 at 11:56 AM.    Dragon voice recognition software used in portions of this document. Patient Location:  [address] [place] [address]    Provider Location ([facility] [place], [place]    This virtual visit was conducted via interactive/real-time audio/video.

## 2019-04-11 ENCOUNTER — TELEPHONE (OUTPATIENT)
Dept: FAMILY MEDICINE CLINIC | Age: 29
End: 2019-04-11

## 2019-04-11 NOTE — TELEPHONE ENCOUNTER
[facility] Trans[facility] Initial Follow Up Call    Outreach made within 2 business days of discharge: Yes    Patient: Inessa Leyva Patient : 1990   MRN: [id number]  Reason for Admission: There are no discharge diagnoses documented for the most recent discharge. Discharge Date: 4/10/19       Spoke with:     L/M for pt to call the office    Scheduled appointment with PCP within 7-14 days    Follow Up  No future appointments.     Josey Singh [place]

## 2019-04-15 NOTE — TELEPHONE ENCOUNTER
[facility] Trans[facility] Initial Follow Up Call    Outreach made within 2 business days of discharge: Yes    Patient: Sandro Mejía Patient : 1990   MRN: [id number]  Reason for Admission: Suicidal ideation, cocaine abuse  Discharge Date: 4/10/19       Spoke with: msg left    Discharge department/facility: [facility]      Follow Up  No future appointments.     Mary Irene MA

## 2019-05-15 LAB
EKG ATRIAL RATE: 87 BPM
EKG P AXIS: 75 DEGREES
EKG P-R INTERVAL: 126 MS
EKG Q-T INTERVAL: 346 MS
EKG QRS DURATION: 90 MS
EKG QTC CALCULATION (BAZETT): 416 MS
EKG R AXIS: 86 DEGREES
EKG T AXIS: 10 DEGREES
EKG VENTRICULAR RATE: 87 BPM

## 2020-08-31 ENCOUNTER — HOSPITAL ENCOUNTER (OUTPATIENT)
Age: 30
Setting detail: SPECIMEN
Discharge: HOME | End: 2020-08-31
Payer: MEDICARE

## 2020-09-01 LAB
HBV SURFACE AB TITR SER: 18.74 MIU/ML
HEPATITIS B CORE IGM ANTIBODY: NONREACTIVE
HEPATITIS B SURFACE ANTIGEN: NONREACTIVE
HEPATITIS C ANTIBODY: NONREACTIVE
HERPES SIMPLEX VIRUS 1 IGG: 0.11
HERPES SIMPLEX VIRUS 2 IGG: 0
HERPES TYPE 1/2 IGM COMBINED: 1.08
HIV AG/AB: NONREACTIVE
SOURCE: NORMAL
TRICHOMONAS VAGINALI, MOLECULAR: NEGATIVE

## 2020-09-02 LAB
C. TRACHOMATIS DNA ,URINE: NEGATIVE
DIRECT EXAM: NORMAL
Lab: NORMAL
N. GONORRHOEAE DNA, URINE: NEGATIVE
SPECIMEN DESCRIPTION: NORMAL
SPECIMEN DESCRIPTION: NORMAL

## 2023-03-23 ENCOUNTER — HOSPITAL ENCOUNTER (INPATIENT)
Age: 33
LOS: 4 days | Discharge: HOME | DRG: 751 | End: 2023-03-27
Attending: EMERGENCY MEDICINE | Admitting: PSYCHIATRY & NEUROLOGY
Payer: MEDICAID

## 2023-03-23 DIAGNOSIS — R45.851 SUICIDAL IDEATION: Primary | ICD-10-CM

## 2023-03-23 PROBLEM — F32.A DEPRESSION WITH SUICIDAL IDEATION: Status: ACTIVE | Noted: 2023-03-23

## 2023-03-23 LAB
ABSOLUTE EOS #: 0.2 K/UL (ref 0–0.4)
ABSOLUTE LYMPH #: 1.2 K/UL (ref 1–4.8)
ABSOLUTE MONO #: 0.6 K/UL (ref 0.1–1.3)
ACETAMINOPHEN LEVEL: <5 UG/ML (ref 10–30)
ALBUMIN SERPL-MCNC: 4.4 G/DL (ref 3.5–5.2)
ALP SERPL-CCNC: 83 U/L (ref 40–129)
ALT SERPL-CCNC: 57 U/L (ref 5–41)
AMPHETAMINE SCREEN URINE: NEGATIVE
ANION GAP SERPL CALCULATED.3IONS-SCNC: 8 MMOL/L (ref 9–17)
AST SERPL-CCNC: 32 U/L
BARBITURATE SCREEN URINE: NEGATIVE
BASOPHILS # BLD: 1 % (ref 0–2)
BASOPHILS ABSOLUTE: 0.1 K/UL (ref 0–0.2)
BENZODIAZEPINE SCREEN, URINE: NEGATIVE
BILIRUB SERPL-MCNC: 0.3 MG/DL (ref 0.3–1.2)
BUN SERPL-MCNC: 12 MG/DL (ref 6–20)
CALCIUM SERPL-MCNC: 9.6 MG/DL (ref 8.6–10.4)
CANNABINOID SCREEN URINE: NEGATIVE
CHLORIDE SERPL-SCNC: 102 MMOL/L (ref 98–107)
CO2 SERPL-SCNC: 30 MMOL/L (ref 20–31)
COCAINE METABOLITE, URINE: NEGATIVE
CREAT SERPL-MCNC: 0.95 MG/DL (ref 0.7–1.2)
EOSINOPHILS RELATIVE PERCENT: 2 % (ref 0–4)
ETHANOL PERCENT: <0.01 %
ETHANOL: <10 MG/DL
FENTANYL URINE: NEGATIVE
GFR SERPL CREATININE-BSD FRML MDRD: >60 ML/MIN/1.73M2
GLUCOSE SERPL-MCNC: 95 MG/DL (ref 70–99)
HCT VFR BLD AUTO: 50.4 % (ref 41–53)
HGB BLD-MCNC: 17.2 G/DL (ref 13.5–17.5)
LYMPHOCYTES # BLD: 20 % (ref 24–44)
MCH RBC QN AUTO: 27.8 PG (ref 26–34)
MCHC RBC AUTO-ENTMCNC: 34.2 G/DL (ref 31–37)
MCV RBC AUTO: 81.3 FL (ref 80–100)
METHADONE SCREEN, URINE: NEGATIVE
MONOCYTES # BLD: 9 % (ref 1–7)
OPIATES, URINE: NEGATIVE
OXYCODONE SCREEN URINE: NEGATIVE
PDW BLD-RTO: 15.5 % (ref 11.5–14.9)
PHENCYCLIDINE, URINE: NEGATIVE
PLATELET # BLD AUTO: 234 K/UL (ref 150–450)
PMV BLD AUTO: 8.2 FL (ref 6–12)
POTASSIUM SERPL-SCNC: 4.5 MMOL/L (ref 3.7–5.3)
PROT SERPL-MCNC: 6.7 G/DL (ref 6.4–8.3)
RBC # BLD: 6.19 M/UL (ref 4.5–5.9)
SALICYLATE LEVEL: <1 MG/DL (ref 3–10)
SEG NEUTROPHILS: 68 % (ref 36–66)
SEGMENTED NEUTROPHILS ABSOLUTE COUNT: 4.2 K/UL (ref 1.3–9.1)
SODIUM SERPL-SCNC: 140 MMOL/L (ref 135–144)
TEST INFORMATION: NORMAL
WBC # BLD AUTO: 6.3 K/UL (ref 3.5–11)

## 2023-03-23 PROCEDURE — 99285 EMERGENCY DEPT VISIT HI MDM: CPT

## 2023-03-23 PROCEDURE — 6370000000 HC RX 637 (ALT 250 FOR IP): Performed by: PSYCHIATRY & NEUROLOGY

## 2023-03-23 PROCEDURE — 1240000000 HC EMOTIONAL WELLNESS R&B

## 2023-03-23 PROCEDURE — 80179 DRUG ASSAY SALICYLATE: CPT

## 2023-03-23 PROCEDURE — 85025 COMPLETE CBC W/AUTO DIFF WBC: CPT

## 2023-03-23 PROCEDURE — 80307 DRUG TEST PRSMV CHEM ANLYZR: CPT

## 2023-03-23 PROCEDURE — 36415 COLL VENOUS BLD VENIPUNCTURE: CPT

## 2023-03-23 PROCEDURE — 80143 DRUG ASSAY ACETAMINOPHEN: CPT

## 2023-03-23 PROCEDURE — 80053 COMPREHEN METABOLIC PANEL: CPT

## 2023-03-23 PROCEDURE — G0480 DRUG TEST DEF 1-7 CLASSES: HCPCS

## 2023-03-23 RX ORDER — DEXTROAMPHETAMINE SACCHARATE, AMPHETAMINE ASPARTATE, DEXTROAMPHETAMINE SULFATE AND AMPHETAMINE SULFATE 5; 5; 5; 5 MG/1; MG/1; MG/1; MG/1
20 TABLET ORAL DAILY
COMMUNITY

## 2023-03-23 RX ORDER — TRAZODONE HYDROCHLORIDE 50 MG/1
50 TABLET ORAL NIGHTLY PRN
Status: DISCONTINUED | OUTPATIENT
Start: 2023-03-23 | End: 2023-03-27 | Stop reason: HOSPADM

## 2023-03-23 RX ORDER — ACETAMINOPHEN 325 MG/1
650 TABLET ORAL EVERY 6 HOURS PRN
Status: DISCONTINUED | OUTPATIENT
Start: 2023-03-23 | End: 2023-03-27 | Stop reason: HOSPADM

## 2023-03-23 RX ORDER — POLYETHYLENE GLYCOL 3350 17 G/17G
17 POWDER, FOR SOLUTION ORAL DAILY PRN
Status: DISCONTINUED | OUTPATIENT
Start: 2023-03-23 | End: 2023-03-27 | Stop reason: HOSPADM

## 2023-03-23 RX ORDER — OMEPRAZOLE 40 MG/1
40 CAPSULE, DELAYED RELEASE ORAL DAILY
Status: ON HOLD | COMMUNITY
End: 2023-03-27 | Stop reason: HOSPADM

## 2023-03-23 RX ORDER — DEXTROAMPHETAMINE SACCHARATE, AMPHETAMINE ASPARTATE, DEXTROAMPHETAMINE SULFATE AND AMPHETAMINE SULFATE 2.5; 2.5; 2.5; 2.5 MG/1; MG/1; MG/1; MG/1
10 TABLET ORAL EVERY EVENING
COMMUNITY

## 2023-03-23 RX ORDER — TESTOSTERONE CYPIONATE 200 MG/ML
300 VIAL (ML) INTRAMUSCULAR WEEKLY
COMMUNITY

## 2023-03-23 RX ORDER — IBUPROFEN 400 MG/1
400 TABLET ORAL EVERY 6 HOURS PRN
Status: DISCONTINUED | OUTPATIENT
Start: 2023-03-23 | End: 2023-03-27 | Stop reason: HOSPADM

## 2023-03-23 RX ORDER — HYDROXYZINE 50 MG/1
50 TABLET, FILM COATED ORAL 3 TIMES DAILY PRN
Status: DISCONTINUED | OUTPATIENT
Start: 2023-03-23 | End: 2023-03-27 | Stop reason: HOSPADM

## 2023-03-23 RX ORDER — M-VIT,TX,IRON,MINS/CALC/FOLIC 27MG-0.4MG
1 TABLET ORAL DAILY
COMMUNITY

## 2023-03-23 RX ORDER — MAGNESIUM HYDROXIDE/ALUMINUM HYDROXICE/SIMETHICONE 120; 1200; 1200 MG/30ML; MG/30ML; MG/30ML
30 SUSPENSION ORAL EVERY 6 HOURS PRN
Status: DISCONTINUED | OUTPATIENT
Start: 2023-03-23 | End: 2023-03-27 | Stop reason: HOSPADM

## 2023-03-23 RX ORDER — ARIPIPRAZOLE 2 MG/1
2 TABLET ORAL NIGHTLY
Status: ON HOLD | COMMUNITY
End: 2023-03-27 | Stop reason: HOSPADM

## 2023-03-23 RX ADMIN — HYDROXYZINE HYDROCHLORIDE 50 MG: 50 TABLET, FILM COATED ORAL at 20:26

## 2023-03-23 ASSESSMENT — PATIENT HEALTH QUESTIONNAIRE - PHQ9: SUM OF ALL RESPONSES TO PHQ QUESTIONS 1-9: 18

## 2023-03-23 ASSESSMENT — ENCOUNTER SYMPTOMS
VOMITING: 0
DIARRHEA: 0
COUGH: 0
SHORTNESS OF BREATH: 0

## 2023-03-23 ASSESSMENT — LIFESTYLE VARIABLES
HOW MANY STANDARD DRINKS CONTAINING ALCOHOL DO YOU HAVE ON A TYPICAL DAY: PATIENT DOES NOT DRINK
HOW MANY STANDARD DRINKS CONTAINING ALCOHOL DO YOU HAVE ON A TYPICAL DAY: PATIENT DOES NOT DRINK
HOW OFTEN DO YOU HAVE A DRINK CONTAINING ALCOHOL: NEVER
HOW OFTEN DO YOU HAVE A DRINK CONTAINING ALCOHOL: NEVER

## 2023-03-23 ASSESSMENT — SLEEP AND FATIGUE QUESTIONNAIRES
DO YOU USE A SLEEP AID: NO
AVERAGE NUMBER OF SLEEP HOURS: 8
DO YOU HAVE DIFFICULTY SLEEPING: NO

## 2023-03-23 ASSESSMENT — PAIN - FUNCTIONAL ASSESSMENT: PAIN_FUNCTIONAL_ASSESSMENT: NONE - DENIES PAIN

## 2023-03-23 NOTE — ED NOTES
Dx: Depression with suicidal ideations     Janice Narayan is a 26 y.o. male who presents at the ED due to suicidal ideations with a plan to overdose. Patient reports that he had just gotten out of a long term relationship and that it did not end well. Patient reports having to move in with his mother. Patient reports that his psych medication and the rest of his belongings are at the other house and he is not allowed to go back to the house. Patient reports being off medication for about a week. Patient does have access to his mothers medication. Patient also reports losing his job about a month ago. Patient also reports losing her car as well. Patient reports that he has been oversleeping due to his depression and is reporting little to not eating. Level of Care:  Writer consulted with Dr. Kellee Belcher who recommends inpatient psychiatric admission for safety and stabilization. Patient voluntary signed in.

## 2023-03-23 NOTE — ED PROVIDER NOTES
abnormals are listed below. Labs Reviewed   CBC WITH AUTO DIFFERENTIAL - Abnormal; Notable for the following components:       Result Value    RBC 6.19 (*)     RDW 15.5 (*)     Seg Neutrophils 68 (*)     Lymphocytes 20 (*)     Monocytes 9 (*)     All other components within normal limits   COMPREHENSIVE METABOLIC PANEL - Abnormal; Notable for the following components:    Anion Gap 8 (*)     ALT 57 (*)     All other components within normal limits   ACETAMINOPHEN LEVEL - Abnormal; Notable for the following components:    Acetaminophen Level <5 (*)     All other components within normal limits   SALICYLATE LEVEL - Abnormal; Notable for the following components:    Salicylate Lvl <1 (*)     All other components within normal limits   ETHANOL   URINE DRUG SCREEN       EMERGENCY DEPARTMENT COURSE:   Vitals:    Vitals:    03/23/23 1605   BP: (!) 143/78   Pulse: 58   Resp: 19   Temp: 98.1 °F (36.7 °C)   TempSrc: Oral   SpO2: 98%   Weight: 260 lb (117.9 kg)   Height: 6' 1\" (1.854 m)       The patient was given the following medications while in the emergency department:  No orders of the defined types were placed in this encounter. -------------------------  CRITICAL CARE:   CONSULTS: None  PROCEDURES: Procedures     FINAL IMPRESSION      1. Suicidal ideation          DISPOSITION/PLAN   DISPOSITION        PATIENT REFERRED TO:  No follow-up provider specified.     DISCHARGE MEDICATIONS:  New Prescriptions    No medications on file         Dimitry Arora MD  Attending Emergency Physician                      Dimitry Arora MD  03/23/23 3967

## 2023-03-23 NOTE — ED NOTES
Oscar in [facility] AN AFFILIATE OF [facility] for pt watch. Safeguard informed that they need to stay with th pt at all times, must be present in the room and if they need a break or relief to let the nurse know so they can be replaced. Safeguards verbalizes understanding. Belongings and pt checked by security. Belongings locked up. Pt in blue gown.       Shannon Li LPN  69/47/34 [id number]

## 2023-03-24 PROBLEM — F18.10 ABUSE OF SMOKED SUBSTANCE (HCC): Status: ACTIVE | Noted: 2023-03-24

## 2023-03-24 PROBLEM — F33.2 MAJOR DEPRESSIVE DISORDER, RECURRENT SEVERE WITHOUT PSYCHOTIC FEATURES (HCC): Status: ACTIVE | Noted: 2023-03-24

## 2023-03-24 PROBLEM — F10.20 ALCOHOLISM (HCC): Status: ACTIVE | Noted: 2023-03-24

## 2023-03-24 PROCEDURE — 6370000000 HC RX 637 (ALT 250 FOR IP): Performed by: PSYCHIATRY & NEUROLOGY

## 2023-03-24 PROCEDURE — 6370000000 HC RX 637 (ALT 250 FOR IP): Performed by: NURSE PRACTITIONER

## 2023-03-24 PROCEDURE — 1240000000 HC EMOTIONAL WELLNESS R&B

## 2023-03-24 PROCEDURE — 99222 1ST HOSP IP/OBS MODERATE 55: CPT | Performed by: INTERNAL MEDICINE

## 2023-03-24 PROCEDURE — 99223 1ST HOSP IP/OBS HIGH 75: CPT | Performed by: NURSE PRACTITIONER

## 2023-03-24 RX ORDER — DEXTROAMPHETAMINE SACCHARATE, AMPHETAMINE ASPARTATE, DEXTROAMPHETAMINE SULFATE AND AMPHETAMINE SULFATE 2.5; 2.5; 2.5; 2.5 MG/1; MG/1; MG/1; MG/1
20 TABLET ORAL DAILY
Status: DISCONTINUED | OUTPATIENT
Start: 2023-03-24 | End: 2023-03-27 | Stop reason: HOSPADM

## 2023-03-24 RX ORDER — ARIPIPRAZOLE 2 MG/1
2 TABLET ORAL NIGHTLY
Status: DISCONTINUED | OUTPATIENT
Start: 2023-03-24 | End: 2023-03-25

## 2023-03-24 RX ORDER — TESTOSTERONE CYPIONATE 200 MG/ML
300 VIAL (ML) INTRAMUSCULAR WEEKLY
Status: DISCONTINUED | OUTPATIENT
Start: 2023-03-24 | End: 2023-03-27

## 2023-03-24 RX ORDER — DEXTROAMPHETAMINE SACCHARATE, AMPHETAMINE ASPARTATE, DEXTROAMPHETAMINE SULFATE AND AMPHETAMINE SULFATE 2.5; 2.5; 2.5; 2.5 MG/1; MG/1; MG/1; MG/1
10 TABLET ORAL EVERY EVENING
Status: DISCONTINUED | OUTPATIENT
Start: 2023-03-24 | End: 2023-03-24

## 2023-03-24 RX ORDER — M-VIT,TX,IRON,MINS/CALC/FOLIC 27MG-0.4MG
1 TABLET ORAL DAILY
Status: DISCONTINUED | OUTPATIENT
Start: 2023-03-24 | End: 2023-03-27 | Stop reason: HOSPADM

## 2023-03-24 RX ORDER — PANTOPRAZOLE SODIUM 40 MG/1
40 TABLET, DELAYED RELEASE ORAL
Status: DISCONTINUED | OUTPATIENT
Start: 2023-03-25 | End: 2023-03-27 | Stop reason: HOSPADM

## 2023-03-24 RX ORDER — DEXTROAMPHETAMINE SACCHARATE, AMPHETAMINE ASPARTATE, DEXTROAMPHETAMINE SULFATE AND AMPHETAMINE SULFATE 2.5; 2.5; 2.5; 2.5 MG/1; MG/1; MG/1; MG/1
10 TABLET ORAL DAILY PRN
Status: DISCONTINUED | OUTPATIENT
Start: 2023-03-24 | End: 2023-03-25

## 2023-03-24 RX ADMIN — DEXTROAMPHETAMINE SACCHARATE, AMPHETAMINE ASPARTATE, DEXTROAMPHETAMINE SULFATE, AMPHETAMINE SULFATE TABLETS, 10 MG,CLL 10 MG: 2.5; 2.5; 2.5; 2.5 TABLET ORAL at 17:22

## 2023-03-24 RX ADMIN — MULTIPLE VITAMINS W/ MINERALS TAB 1 TABLET: TAB at 12:22

## 2023-03-24 RX ADMIN — DEXTROAMPHETAMINE SACCHARATE, AMPHETAMINE ASPARTATE, DEXTROAMPHETAMINE SULFATE, AMPHETAMINE SULFATE TABLETS, 10 MG,CLL 20 MG: 2.5; 2.5; 2.5; 2.5 TABLET ORAL at 12:22

## 2023-03-24 RX ADMIN — ARIPIPRAZOLE 2 MG: 2 TABLET ORAL at 21:09

## 2023-03-24 RX ADMIN — HYDROXYZINE HYDROCHLORIDE 50 MG: 50 TABLET, FILM COATED ORAL at 21:09

## 2023-03-24 NOTE — GROUP NOTE
Group Therapy Note    Date: 3/24/2023    Group Start Time: 1400  Group End Time: 1440  Group Topic: relaxation group     CZ ESTELLA Vaughn, CTRS        Group Therapy Note    Attendees: 6/19       Patient's Goal:  pt will identify benefits of using art as a coping skill    Notes:   pt was pleasant and participated well     Status After Intervention:  Improved    Participation Level:  Active Listener and Interactive    Participation Quality: Appropriate and Supportive      Speech:  normal      Thought Process/Content: Logical      Affective Functioning: Congruent      Mood: euthymic      Level of consciousness:  Alert      Response to Learning: Able to verbalize current knowledge/experience and Progressing to goal      Endings: None Reported    Modes of Intervention: Education, Support, Socialization, and Activity      Discipline Responsible: Psychoeducational Specialist      Signature:  Cassie Villeda

## 2023-03-24 NOTE — GROUP NOTE
Group Therapy Note    Date: 3/24/2023    Group Start Time: 6721  Group End Time: 1115  Group Topic: Cognitive Skills    LONG BHRADHA Lopez    Cognitive Skills Group Note        Date: March 24, 2023 Start Time:  1045am   End Time:  1115am      Number of Participants in Group & Unit Census:  6/19    Topic: cognitive skills     Goal of Group: pt will demonstrate improved concentration and improved social skills       Comments:     Patient did not participate in Cognitive Skills group, despite staff encouragement and explanation of benefits. Patient remain seclusive to self. Q15 minute safety checks maintained for patient safety and will continue to encourage patient to attend unit programming.             Signature:  Teresa Sawant

## 2023-03-25 PROCEDURE — 1240000000 HC EMOTIONAL WELLNESS R&B

## 2023-03-25 PROCEDURE — 99232 SBSQ HOSP IP/OBS MODERATE 35: CPT | Performed by: PSYCHIATRY & NEUROLOGY

## 2023-03-25 PROCEDURE — 6370000000 HC RX 637 (ALT 250 FOR IP): Performed by: NURSE PRACTITIONER

## 2023-03-25 PROCEDURE — 6370000000 HC RX 637 (ALT 250 FOR IP): Performed by: PSYCHIATRY & NEUROLOGY

## 2023-03-25 RX ORDER — ARIPIPRAZOLE 5 MG/1
5 TABLET ORAL NIGHTLY
Status: DISCONTINUED | OUTPATIENT
Start: 2023-03-25 | End: 2023-03-27 | Stop reason: HOSPADM

## 2023-03-25 RX ORDER — DEXTROAMPHETAMINE SACCHARATE, AMPHETAMINE ASPARTATE, DEXTROAMPHETAMINE SULFATE AND AMPHETAMINE SULFATE 2.5; 2.5; 2.5; 2.5 MG/1; MG/1; MG/1; MG/1
10 TABLET ORAL DAILY
Status: DISCONTINUED | OUTPATIENT
Start: 2023-03-25 | End: 2023-03-27 | Stop reason: HOSPADM

## 2023-03-25 RX ADMIN — MULTIPLE VITAMINS W/ MINERALS TAB 1 TABLET: TAB at 08:01

## 2023-03-25 RX ADMIN — HYDROXYZINE HYDROCHLORIDE 50 MG: 50 TABLET, FILM COATED ORAL at 20:53

## 2023-03-25 RX ADMIN — PANTOPRAZOLE SODIUM 40 MG: 40 TABLET, DELAYED RELEASE ORAL at 08:01

## 2023-03-25 RX ADMIN — DEXTROAMPHETAMINE SACCHARATE, AMPHETAMINE ASPARTATE, DEXTROAMPHETAMINE SULFATE, AMPHETAMINE SULFATE TABLETS, 10 MG,CLL 10 MG: 2.5; 2.5; 2.5; 2.5 TABLET ORAL at 13:44

## 2023-03-25 RX ADMIN — DEXTROAMPHETAMINE SACCHARATE, AMPHETAMINE ASPARTATE, DEXTROAMPHETAMINE SULFATE, AMPHETAMINE SULFATE TABLETS, 10 MG,CLL 20 MG: 2.5; 2.5; 2.5; 2.5 TABLET ORAL at 08:01

## 2023-03-25 RX ADMIN — ARIPIPRAZOLE 5 MG: 5 TABLET ORAL at 20:53

## 2023-03-25 RX ADMIN — HYDROXYZINE HYDROCHLORIDE 50 MG: 50 TABLET, FILM COATED ORAL at 04:20

## 2023-03-25 RX ADMIN — TRAZODONE HYDROCHLORIDE 50 MG: 50 TABLET ORAL at 20:53

## 2023-03-25 NOTE — GROUP NOTE
Group Therapy Note    Date: 3/25/2023    Group Start Time: 2744  Group End Time: 0935  Group Topic: Group Documentation    [facility]    Amilcar Benson RN        Group Therapy Note    Attendees: 6/19       Patient's Goal:  Stay patient, talk to nurse practitioner about ADHD medication    Notes:  Goals group    Status After Intervention:  Unchanged    Participation Level:  Active Listener and Interactive    Participation Quality: Appropriate, Attentive, and Sharing      Speech:  normal      Thought Process/Content: Logical  Linear      Affective Functioning: Congruent      Mood: anxious      Level of consciousness:  Alert, Oriented x4, and Attentive      Response to Learning: Able to verbalize current knowledge/experience, Able to verbalize/acknowledge new learning, and Able to retain information      Endings: None Reported    Modes of Intervention: Support, Socialization, and Exploration      Discipline Responsible: [employer]      Signature:  Amilcar Benson RN

## 2023-03-25 NOTE — H&P
bedtime   Yes Historical Provider, MD   amphetamine-dextroamphetamine (ADDERALL, 20MG,) 20 MG tablet Take 20 mg by mouth daily. Yes Historical Provider, MD   amphetamine-dextroamphetamine (ADDERALL) 10 MG tablet Take 10 mg by mouth every evening. Yes Historical Provider, MD   omeprazole (PRILOSEC) 40 MG delayed release capsule Take 40 mg by mouth daily   Yes Historical Provider, MD        Allergies:     Patient has no known allergies. Social History:     Tobacco:    reports that he has quit smoking. He has a 0.75 pack-year smoking history. He has never used smokeless tobacco.  Alcohol:      reports that he does not currently use alcohol. Drug Use:  reports current drug use. Drug: Cocaine. Family History:     Family History   Problem Relation Age of Onset    Cancer Father         bone        Review of Systems:     Positive and Negative as described in HPI. Physical Exam:     BP (!) 133/93   Pulse 80   Temp 98 °F (36.7 °C)   Resp 14   Ht 6' 1\" (1.854 m)   Wt 260 lb (117.9 kg)   SpO2 96%   BMI 34.30 kg/m²   Temp (24hrs), Av.8 °F (36.6 °C), Min:97.6 °F (36.4 °C), Max:98 °F (36.7 °C)    No results for input(s): POCGLU in the last 72 hours. No intake or output data in the 24 hours ending 23 2320    General Appearance:  alert, well appearing, and in no acute distress  Mental status: oriented to person, place, and time with normal affect  Head:  normocephalic, atraumatic. Eye: no icterus, redness, pupils equal and reactive, extraocular eye movements intact, conjunctiva clear  Ear: normal external ear, no discharge, hearing intact  Nose:  no drainage noted  Mouth: mucous membranes moist  Neck: supple, no carotid bruits, thyroid not palpable  Lungs: Bilateral equal air entry, clear to ausculation, no wheezing, rales or rhonchi, normal effort  Cardiovascular: normal rate, regular rhythm, no murmur, gallop, rub.   Abdomen: Soft, nontender, nondistended, normal bowel sounds, no hepatomegaly or
off unit              Denies homicidal ideations               Denies hallucinations              Denies delusions              Denies paranoia  Cognition:  Oriented to self, location, time, situation  Concentration: Clinically adequate  Memory: Intact  Insight &Judgment: Poor         DSM-5 Diagnosis    Principal Problem: Major depressive disorder, recurrent severe without psychotic features ([facility] [address]    ADHD  Hx of cocaine abuse    Psychosocial and Contextual factors:  Financial x  Occupational x  Relationship x  Legal   Living situation x  Educational     Past Medical History:   Diagnosis Date    ADD (attention deficit disorder)     not sure of diag, was on adderall in college, then approx 6 mos util 3/2017    Cocaine abuse ([facility] [address]     Concussion     Depressive disorder     was put on med at time hospitalization, [facility], took celexa x sev wks only. Murmur         PLAN:  Continue inpatient psychiatric treatment. Home medications reviewed. Restart home medications. Abilify 2 mg po daily  Adderall 20 mg daily and 10 mg afternoon   Monitor need and frequency of PRN medications. Attempt to develop insight. Follow-up daily while inpatient. Reviewed medications risks and benefits as well as potential side effects with patient. CONSULT:  [x] Yes [] No  Internal medicine for medical management/medical H&P      Risk Management: close watch per standard protocol      Psychotherapy: participation in milieu and group and individual sessions with Attending Physician,  and Physician Assistant/CNP      Estimated length of stay:  2-14 days      GENERAL PATIENT/FAMILY EDUCATION  Patient will understand basic signs and symptoms, patient will understand benefits/risks and potential side effects from proposed medications, and patient will understand their role in recovery. Family is active in patient's care.    Patient assets that may be helpful during treatment include: Intent to participate and engage in

## 2023-03-25 NOTE — GROUP NOTE
Group Therapy Note    Date: 3/25/2023    Group Start Time: 1330  Group End Time: 3556  Group Topic: receation group     [facility]    Starlet Flatness, CTRS        Group Therapy Note    Attendees: 9/19       Patient's Goal:  pt will demonstrate improved coping skills and improved decision making skills     Notes:   pt was pleasant and participated well     Status After Intervention:  Improved    Participation Level:  Active Listener and Interactive    Participation Quality: Appropriate and Supportive      Speech:  normal      Thought Process/Content: Logical      Affective Functioning: Congruent      Mood: euthymic      Level of consciousness:  Alert      Response to Learning: Able to verbalize current knowledge/experience and Progressing to goal      Endings: None Reported    Modes of Intervention: Education, Support, Socialization, and Activity      Discipline Responsible: Psychoeducational Specialist      Signature:  Constance Jimenez

## 2023-03-26 PROCEDURE — 1240000000 HC EMOTIONAL WELLNESS R&B

## 2023-03-26 PROCEDURE — 6370000000 HC RX 637 (ALT 250 FOR IP): Performed by: INTERNAL MEDICINE

## 2023-03-26 PROCEDURE — 6370000000 HC RX 637 (ALT 250 FOR IP): Performed by: NURSE PRACTITIONER

## 2023-03-26 PROCEDURE — 6370000000 HC RX 637 (ALT 250 FOR IP): Performed by: PSYCHIATRY & NEUROLOGY

## 2023-03-26 PROCEDURE — 99232 SBSQ HOSP IP/OBS MODERATE 35: CPT | Performed by: INTERNAL MEDICINE

## 2023-03-26 RX ORDER — DOXYCYCLINE 100 MG/1
100 CAPSULE ORAL EVERY 12 HOURS SCHEDULED
Status: DISCONTINUED | OUTPATIENT
Start: 2023-03-26 | End: 2023-03-27 | Stop reason: HOSPADM

## 2023-03-26 RX ORDER — LISINOPRIL 5 MG/1
5 TABLET ORAL DAILY
Status: DISCONTINUED | OUTPATIENT
Start: 2023-03-26 | End: 2023-03-27 | Stop reason: HOSPADM

## 2023-03-26 RX ADMIN — PANTOPRAZOLE SODIUM 40 MG: 40 TABLET, DELAYED RELEASE ORAL at 06:58

## 2023-03-26 RX ADMIN — MULTIPLE VITAMINS W/ MINERALS TAB 1 TABLET: TAB at 08:10

## 2023-03-26 RX ADMIN — LISINOPRIL 5 MG: 5 TABLET ORAL at 21:23

## 2023-03-26 RX ADMIN — DEXTROAMPHETAMINE SACCHARATE, AMPHETAMINE ASPARTATE, DEXTROAMPHETAMINE SULFATE, AMPHETAMINE SULFATE TABLETS, 10 MG,CLL 20 MG: 2.5; 2.5; 2.5; 2.5 TABLET ORAL at 06:58

## 2023-03-26 RX ADMIN — ARIPIPRAZOLE 5 MG: 5 TABLET ORAL at 21:23

## 2023-03-26 RX ADMIN — TRAZODONE HYDROCHLORIDE 50 MG: 50 TABLET ORAL at 21:23

## 2023-03-26 RX ADMIN — HYDROXYZINE HYDROCHLORIDE 50 MG: 50 TABLET, FILM COATED ORAL at 21:23

## 2023-03-26 RX ADMIN — DEXTROAMPHETAMINE SACCHARATE, AMPHETAMINE ASPARTATE, DEXTROAMPHETAMINE SULFATE, AMPHETAMINE SULFATE TABLETS, 10 MG,CLL 10 MG: 2.5; 2.5; 2.5; 2.5 TABLET ORAL at 14:05

## 2023-03-26 ASSESSMENT — PAIN SCALES - GENERAL: PAINLEVEL_OUTOF10: 0

## 2023-03-26 NOTE — GROUP NOTE
Group Therapy Note    Date: 3/26/2023    Group Start Time: 1400  Group End Time: 4719  Group Topic: Music Therapy    [facility]    Sujit Olson        Group Therapy Note    Attendees: 10/20     Patient's Goal:  Patients shared preferred music and dedicated songs to important people in their lives. Goals to reflect on relationships; Increase self-expression; Increase sense of community; Increase socialization; Normalization of the environment    Notes:  Patient attended and participated in group having positive interactions with peers and staff. Patient shared music and dedicated song to him and his girlfriend. Talked about music with peers throughout group with positive insight, mentioned and discussed Maslows hierarchy after a topic brought up by peer    Status After Intervention:  Improved    Participation Level:  Active Listener and Interactive    Participation Quality: Appropriate, Attentive, supportive, and Sharing      Speech:  normal      Thought Process/Content: Logical  Linear      Affective Functioning: Congruent      Mood: euthymic      Level of consciousness:  Alert and Attentive      Response to Learning: Able to verbalize current knowledge/experience, capable of insight, and Progressing to goal      Endings: None Reported    Modes of Intervention: Support, Socialization, Exploration, Activity, Media, and Reality-testing      Discipline Responsible: Psychoeducational Specialist      Signature:  Sujit Olson

## 2023-03-26 NOTE — GROUP NOTE
Group Therapy Note    Date: 3/26/2023    Group Start Time: 0900  Group End Time: 0945  Group Topic: Community Meeting    LONG Jameson RN        Group Therapy Note    Attendees: 9/22       Patient's Goal:  Today: work on decreasing angry outbursts      Status After Intervention:  Improved    Participation Level:  Active Listener and Interactive    Participation Quality: Appropriate, Attentive, and Sharing      Speech:  normal      Thought Process/Content: Logical  Linear      Affective Functioning: Congruent      Mood: euthymic      Level of consciousness:  Alert and Oriented x4      Response to Learning: Able to verbalize/acknowledge new learning, Able to retain information, and Capable of insight      Endings: None Reported    Modes of Intervention: Education, Socialization, and Clarifying      Discipline Responsible: Registered Nurse      Signature:  Tommy Jameson RN

## 2023-03-27 VITALS
OXYGEN SATURATION: 96 % | BODY MASS INDEX: 34.46 KG/M2 | RESPIRATION RATE: 14 BRPM | DIASTOLIC BLOOD PRESSURE: 86 MMHG | TEMPERATURE: 98 F | SYSTOLIC BLOOD PRESSURE: 141 MMHG | HEART RATE: 82 BPM | HEIGHT: 73 IN | WEIGHT: 260 LBS

## 2023-03-27 PROCEDURE — 6360000002 HC RX W HCPCS: Performed by: NURSE PRACTITIONER

## 2023-03-27 PROCEDURE — 6370000000 HC RX 637 (ALT 250 FOR IP): Performed by: INTERNAL MEDICINE

## 2023-03-27 PROCEDURE — 6370000000 HC RX 637 (ALT 250 FOR IP): Performed by: PSYCHIATRY & NEUROLOGY

## 2023-03-27 PROCEDURE — 6370000000 HC RX 637 (ALT 250 FOR IP): Performed by: NURSE PRACTITIONER

## 2023-03-27 RX ORDER — TESTOSTERONE CYPIONATE 200 MG/ML
300 INJECTION, SOLUTION INTRAMUSCULAR WEEKLY
Status: DISCONTINUED | OUTPATIENT
Start: 2023-03-27 | End: 2023-03-27 | Stop reason: HOSPADM

## 2023-03-27 RX ORDER — PANTOPRAZOLE SODIUM 40 MG/1
40 TABLET, DELAYED RELEASE ORAL
Qty: 30 TABLET | Refills: 0 | Status: SHIPPED | OUTPATIENT
Start: 2023-03-28

## 2023-03-27 RX ORDER — TRAZODONE HYDROCHLORIDE 50 MG/1
50 TABLET ORAL NIGHTLY PRN
Qty: 14 TABLET | Refills: 0 | Status: SHIPPED | OUTPATIENT
Start: 2023-03-27

## 2023-03-27 RX ORDER — LISINOPRIL 5 MG/1
5 TABLET ORAL DAILY
Qty: 30 TABLET | Refills: 0 | Status: SHIPPED | OUTPATIENT
Start: 2023-03-28

## 2023-03-27 RX ORDER — ARIPIPRAZOLE 5 MG/1
5 TABLET ORAL NIGHTLY
Qty: 30 TABLET | Refills: 0 | Status: SHIPPED | OUTPATIENT
Start: 2023-03-27

## 2023-03-27 RX ORDER — DOXYCYCLINE 100 MG/1
100 CAPSULE ORAL EVERY 12 HOURS SCHEDULED
Qty: 10 CAPSULE | Refills: 0 | Status: SHIPPED | OUTPATIENT
Start: 2023-03-27 | End: 2023-04-01

## 2023-03-27 RX ORDER — HYDROXYZINE 50 MG/1
50 TABLET, FILM COATED ORAL 3 TIMES DAILY PRN
Qty: 14 TABLET | Refills: 0 | Status: SHIPPED | OUTPATIENT
Start: 2023-03-27 | End: 2023-04-06

## 2023-03-27 RX ADMIN — TESTOSTERONE CYPIONATE 300 MG: 200 INJECTION, SOLUTION INTRAMUSCULAR at 14:06

## 2023-03-27 RX ADMIN — LISINOPRIL 5 MG: 5 TABLET ORAL at 10:29

## 2023-03-27 RX ADMIN — DEXTROAMPHETAMINE SACCHARATE, AMPHETAMINE ASPARTATE, DEXTROAMPHETAMINE SULFATE, AMPHETAMINE SULFATE TABLETS, 10 MG,CLL 20 MG: 2.5; 2.5; 2.5; 2.5 TABLET ORAL at 06:17

## 2023-03-27 RX ADMIN — DEXTROAMPHETAMINE SACCHARATE, AMPHETAMINE ASPARTATE, DEXTROAMPHETAMINE SULFATE, AMPHETAMINE SULFATE TABLETS, 10 MG,CLL 10 MG: 2.5; 2.5; 2.5; 2.5 TABLET ORAL at 14:04

## 2023-03-27 RX ADMIN — MULTIPLE VITAMINS W/ MINERALS TAB 1 TABLET: TAB at 10:30

## 2023-03-27 RX ADMIN — PANTOPRAZOLE SODIUM 40 MG: 40 TABLET, DELAYED RELEASE ORAL at 06:17

## 2023-03-27 NOTE — GROUP NOTE
Group Therapy Note    Date: 3/27/2023    Group Start Time: 0915  Group End Time: 0483  Group Topic: Community Meeting    [facility]    Jeff Garcia LPN        Group Therapy Note    Attendees: 9/23       Patient's Goal:  Discharge     Notes:  anxious and has no access to medicine and was recently kick out of home     Status After Intervention:  Unchanged    Participation Level:  Active Listener    Participation Quality: Appropriate      Speech:  normal      Thought Process/Content: Logical      Affective Functioning: Congruent      Mood: euthymic      Level of consciousness:  Alert and Oriented x4      Response to Learning: Able to verbalize current knowledge/experience and Able to verbalize/acknowledge new learning      Endings: None Reported    Modes of Intervention: Education and Socialization      Discipline Responsible: Licensed Practical Nurse      Signature:  Jeff Garcia LPN

## 2023-03-27 NOTE — PROGRESS NOTES
03/27/23 1406   Encounter Summary   Encounter Overview/Reason  Spiritual/Emotional Needs   Service Provided For: Patient   Referral/Consult From: Radha   Last Encounter  03/27/23   Complexity of Encounter Moderate   Begin Time 0130   End Time  0200   Total Time Calculated 30 min   Spiritual/Emotional needs   Type Spiritual Support   Behavioral Health    Type  Spirituality Group   Assessment/Intervention/Outcome   Assessment Calm   Intervention Active listening;Nurtured Hope;Sustaining Presence/Ministry of presence   Outcome Receptive; Expressed Gratitude;Engaged in conversation
[address] [facility] Internal Medicine  Juwan Kraus MD; Pinky Wu MD; Fernanda Bautista MD; MD Pratibha Blackburn MD; MD DAY [facility] Internal Medicine   [address] / HISTORY AND PHYSICAL EXAMINATION            Date:   3/26/2023  Patient name:  Bridgette Allen  Date of admission:  3/23/2023  4:09 PM  MRN:   [id number]  Account:  [de-identified]  YOB: 1990  PCP:    No primary care provider on file. Room:   [address] [place] [address]  Code Status:    Full Code    Physician Requesting Consult: Jose Corcoran MD    Reason for Consult: History and physical examination    Chief Complaint:     Chief Complaint   Patient presents with    Mental Health Problem     Suicidal - bipolar        History Obtained From:     patient    History of Present Illness:     40-year-old gentleman with underlying history of anxiety, depression, cocaine abuse, polysubstance abuse, testosterone abuse, admitted to inpatient psych for acute psychosis and suicidal ideations    Past Medical History:     Past Medical History:   Diagnosis Date    ADD (attention deficit disorder)     not sure of diag, was on adderall in college, then approx 6 mos util 3/2017    Cocaine abuse ([facility] [address]     Concussion     Depressive disorder     was put on med at time hospitalization, [facility], took celexa x sev wks only. Murmur         Past Surgical History:     History reviewed. No pertinent surgical history. Medications Prior to Admission:     Prior to Admission medications    Medication Sig Start Date End Date Taking?  Authorizing Provider   Multiple Vitamins-Minerals (THERAPEUTIC MULTIVITAMIN-MINERALS) tablet Take 1 tablet by mouth daily   Yes Historical Provider, MD   Testosterone Cypionate 200 MG/ML SOLN Inject 300 mg into the muscle once a week Indications: Mondays   Yes Historical Provider, MD   ARIPiprazole (ABILIFY) 2 MG tablet Take 2 mg by mouth at
Behavioral Services  Medicare Certification Upon Admission    I certify that this patient's inpatient psychiatric hospital admission is medically necessary for:    [x] (1) Treatment which could reasonably be expected to improve this patient's condition,       [x] (2) Or for diagnostic study;     AND     [x](2) The inpatient psychiatric services are provided while the individual is under the care of a physician and are included in the individualized plan of care.     Estimated length of stay/service 2-9 days    Plan for post-hospital care -outpatient care    Electronically signed by Millicent Mills MD on 3/24/2023 at 9:53 AM
CLINICAL PHARMACY NOTE: MEDS TO BEDS    Total # of Prescriptions Filled: 5   The following medications were delivered to the patient:  Pantoprazole Sodium 40mg  Hydroxyzine HCL 50mg  Trazodone HCL 50mg  Lisinopril 5mg  Doxycyline Monohydrate 100mg    Additional Documentation:  Delivered Medication to [address]     Refill(s) Too Soon + Profiled Rx(s)   - Abilify
Medication History completed:    New medications: Testosterone cypionate injection, omeprazole, aripiprazole, Adderall    Medications discontinued: none    Medications flagged for review: none    Changes to dosing: none    Stated allergies: NKDA    Other pertinent information: Medications confirmed with [facility]/pharmacy and [facility]. The patient reports to me that he is currently taking Adderall ER 30 mg in the morning and Adderall IR 20 mg daily in the afternoon if needed. Upon discussion with the [facility] pharmacist, the patient reported to [facility] last week that he is to be taking Adderall IR 20 mg in the morning and Adderall IR 10 mg in the afternoon due to significant side effects from the ER 30 mg dose. He filled the Adderall ER 30 mg capsules on 3/14/23 for 30 days, Adderall IR 10 mg tablets on 3/16/23 for 30 days, and Adderall IR 20 mg tablets on 3/17/23 for 30 days. The [facility] pharmacist reports that he discussed the regimen with the patient on 3/17 prior to dispensing the 20 mg tabs, and at that time the patient reported that he would only be using the IR tablets. Testosterone cypionate was last filled on 2/27/23 for 28 days. The patient reports he has not had access to his medications since 3/17/23.      Thank you,  Armando Carter, PharmD, BCPS  [phone]
Patient is a former smoker and denies the need for smoking cessation aids and counseling.
Provider notified of suicide BPA and patient's current mental status. Provider declined 1:1 and ordered patient monitoring every 15 minutes and random intervals.
RT ASSESSMENT TREATMENT GOALS    [x]Pt Goal:  Pt will identify 1-2 positive coping skills by time of discharge. []Pt Goal:  Pt will identify 1-2 positive aspects of self by time of discharge. []Pt Goal:  Pt will remain on task/topic for 15-30 minutes during group by time of discharge. []Pt Goal:  Pt will identify 1-2 aspects of relapse prevention plan by time of discharge. []Pt Goal:  Pt will join in conversation with peers 1-2 times per group by time of discharge. []Pt Goal:  Pt will identify 1-2 new leisure interests by time of discharge. []Pt Goal:  Pt will not voice any delusional content by time of discharge.
depressive disorder, recurrent severe without psychotic features ([facility] [address]         PLAN  Patient symptoms are: Showing modest improvement  No Medication Changes Today  Monitor need and frequency of PRN medications. Encourage participation in groups and milieu. Attempt to develop insight. Psycho-education conducted. Probable discharge is 1 to 2 days  Follow-up daily while inpatient. During in person evaluation today approximately 16 minutes was spent in  Supportive psychotherapy. Patient continues to be monitored in the inpatient psychiatric facility at [facility] for safety and stabilization. Patient continues to need, on a daily basis, active treatment furnished directly by or requiring the supervision of inpatient psychiatric personnel. Electronically signed by ANGIE Woodall CNP on 3/26/2023 at 2:36 PM    **This report has been created using voice recognition software. It may contain minor errors which are inherent in voice recognition technology. **
voice recognition technology. **

## 2023-03-27 NOTE — GROUP NOTE
Group Therapy Note    Date: 3/27/2023    Group Start Time: 9367  Group End Time: 1120  Group Topic: Cognitive Skills    [facility]    RADHA Mancilla    Cognitive Skills Group Note        Date: March 27, 2023 Start Time:  1045am   End Time:1120am      Number of Participants in Group & Unit Census:  8/21    Topic: cognitive skills     Goal of Group: pt will demonstrate improved concentration and improved decision making skills       Comments:     Patient did not participate in Cognitive Skills group, despite staff encouragement and explanation of benefits. Patient remain seclusive to self. Q15 minute safety checks maintained for patient safety and will continue to encourage patient to attend unit programming.               Signature:  Leah Tripathi

## 2023-03-27 NOTE — GROUP NOTE
Group Therapy Note    Date: 3/27/2023    Group Start Time: 7311  Group End Time: 2484  Group Topic: recreation therapy group     [facility]    Will Caba, CTRS        Group Therapy Note    Attendees:7/19       Patient's Goal:  pt will deomonstrate improved decision making skills and improved concentration     Notes:  pt was pleasant and participated well     Status After Intervention:  Improved    Participation Level:  Active Listener and Interactive    Participation Quality: Appropriate and Supportive      Speech:  normal      Thought Process/Content: Logical      Affective Functioning: Congruent      Mood: euthymic      Level of consciousness:  Alert      Response to Learning: Able to verbalize current knowledge/experience and Progressing to goal      Endings: None Reported    Modes of Intervention: Education, Support, Socialization, and Activity      Discipline Responsible: Psychoeducational Specialist      Signature:  Faith Sandhu

## 2023-03-27 NOTE — DISCHARGE INSTRUCTIONS
Information:  Medications:   Medication summary provided   I understand that I should take only the medications on my list.     -why and when I need to take each medicine.     -which side effects to watch for.     -that I should carry my medication information at all times in case of     Emergency situations. I will take all of my medicines to follow up appointments.     -check with my physician or pharmacist before taking any new    Medication, over the counter product or drink alcohol.    -Ask about food, drug or dietary supplement interactions.    -discard old lists and update records with medication providers. Notify Physician:  Notify physician if you notice:   Always call 911 if you feel your life is in danger  In case of an emergency call 911 immediately! If 911 is not available call your local emergency medical system for help    Behavioral Health Follow Up:  Original Referral Source:NAIF  Discharge Diagnosis: Suicidal ideation [R45.851]  Depression with suicidal ideation [F32. A, R45.851]  Recommendations for Level of Care: take medications as ordered, keep all follow up appt  Patient status at discharge: alert/oriented, medication compliant  My hospital  was: [facility]  Aftercare plan faxed: [facility]   -faxed by: staff   -date: 3/27/23   -time: 15:54  Prescriptions: filled and sent with patient    Smoking: Quit Smoking. Call the [employer]'s smoking quitline at 9-239-16R-QUIT  Know the signs of a heart attack   If you have any of the following symptoms call 911 immediately, do not wait more    Than five minutes. 1. Pressure, fullness and/ or squeezing in the center of the chest spreading to    The jaw, neck or shoulder. 2. Chest discomfort with light headedness, fainting, sweating, nausea or    Shortness of breath. 3. Upper abdominal pressure or discomfort. 4. Lower chest pain, back pain, unusual fatigue, shortness of breath, nausea   Or dizziness.      General Information:   Questions

## 2023-03-27 NOTE — PLAN OF CARE
Problem: Self Harm/Suicidality  Goal: Will have no self-injury during hospital stay  Description: INTERVENTIONS:  1. Ensure constant observer at bedside with Q15M safety checks  2. Maintain a safe environment  3. Secure patient belongings  4. Ensure family/visitors adhere to safety recommendations  5. Ensure safety tray has been added to patient's diet order  6. Every shift and PRN: Re-assess suicidal risk via Frequent Screener    3/25/2023 1042 by Lourdes Junior RN  Note: Mr. Saud Duvall reports ongoing depression related to his current situation and feels hopeless about supporting himself and being able to put his \"life back together\". Mr. Saud Duvall endorses fleeting suicidal ideation and is able to contract here, but does not feel he would be safe in the community. He spends most of the morning in the milieu watching tv. He is friendly with peers and staff. Mr. Saud Duvall adheres to his medication as prescribed, and consumes meals. Problem: Anxiety  Goal: Will report anxiety at manageable levels  Description: INTERVENTIONS:  1. Administer medication as ordered  2. Teach and rehearse alternative coping skills  3. Provide emotional support with 1:1 interaction with staff  3/25/2023 1042 by Lourdes Junior RN  Note: Mr. Saud Duvall denies generalized anxiety, but endorses feeling anxious about \"everything going on right now\" in his life.
Problem: Self Harm/Suicidality  Goal: Will have no self-injury during hospital stay  Description: INTERVENTIONS:  1. Ensure constant observer at bedside with Q15M safety checks  2. Maintain a safe environment  3. Secure patient belongings  4. Ensure family/visitors adhere to safety recommendations  5. Ensure safety tray has been added to patient's diet order  6. Every shift and PRN: Re-assess suicidal risk via Frequent Screener    3/26/2023 0040 by Lynda Sicard, RN  Outcome: Progressing  Flowsheets (Taken 3/26/2023 0040)  Will have no self-injury during hospital stay:   Maintain a safe environment   Ensure family/visitors adhere to safety recommendations   Every shift and PRN: Re-assess suicidal risk via Frequent Screener  Note: Patient remains free from self-harm at this time. Patient denies experiencing suicidal and homicidal ideations. Patient remains safe on the unit. Safety checks remain in place every 15 minutes and as needed. Problem: Anxiety  Goal: Will report anxiety at manageable levels  Description: INTERVENTIONS:  1. Administer medication as ordered  2. Teach and rehearse alternative coping skills  3. Provide emotional support with 1:1 interaction with staff  3/26/2023 0040 by Lynda Sicard, RN  Outcome: Progressing  Flowsheets (Taken 3/26/2023 0040)  Will report anxiety at manageable levels:   Administer medication as ordered   Provide emotional support with 1:1 interaction with staff   Teach and rehearse alternative coping skills  Note: Patient reports mild depression and anxiety. Patient reports his mood is [place]. \" Patient spent time out in the day area but is aloof of peers.
Problem: Self Harm/Suicidality  Goal: Will have no self-injury during hospital stay  Description: INTERVENTIONS:  1. Ensure constant observer at bedside with Q15M safety checks  2. Maintain a safe environment  3. Secure patient belongings  4. Ensure family/visitors adhere to safety recommendations  5. Ensure safety tray has been added to patient's diet order  6. Every shift and PRN: Re-assess suicidal risk via Frequent Screener    3/26/2023 2359 by Don Brady RN  Outcome: Progressing  Patient has made no attempt to harm self at this time. Problem: Anxiety  Goal: Will report anxiety at manageable levels  Description: INTERVENTIONS:  1. Administer medication as ordered  2. Teach and rehearse alternative coping skills  3. Provide emotional support with 1:1 interaction with staff  3/26/2023 2359 by Don Brady RN  Outcome: Progressing  Patient reports feeling ready for discharge but expresses some anxiety related to hoping he will get all of his medications upon discharge. Patient is worried that he may not get one of his medications because it is a controlled substance. He states he was told two different this by different providers. He is cooperative but remains isolative to self. Patient denies suicidal ideation, homicidal ideation and hallucinations at this time. Safety plan reviewed with patient, agrees to approach staff when feeling upset. 15 minute and random checks maintained for safety. No violent or escalating behaviors noted during this shift. Patient is currently calm, controlled and medication-compliant. Problem: Pain  Goal: Verbalizes/displays adequate comfort level or baseline comfort level  Outcome: Progressing  Patient denies pain at this time.
Problem: Self Harm/Suicidality  Goal: Will have no self-injury during hospital stay  Description: INTERVENTIONS:  1. Ensure constant observer at bedside with Q15M safety checks  2. Maintain a safe environment  3. Secure patient belongings  4. Ensure family/visitors adhere to safety recommendations  5. Ensure safety tray has been added to patient's diet order  6. Every shift and PRN: Re-assess suicidal risk via Frequent Screener    3/27/2023 1132 by Simin Adams LPN  Outcome: Progressing  Note: No violent or negative behaviors noted at this time. Will cont to provide safe, calm, environment and use verbal de-escalation techniques when needed. Support and reassurance given as needed.
Problem: Self Harm/Suicidality  Goal: Will have no self-injury during hospital stay  Description: INTERVENTIONS:  1. Ensure constant observer at bedside with Q15M safety checks  2. Maintain a safe environment  3. Secure patient belongings  4. Ensure family/visitors adhere to safety recommendations  5. Ensure safety tray has been added to patient's diet order  6. Every shift and PRN: Re-assess suicidal risk via Frequent Screener    Note: Mr. Pascual Rahman denies suicidal ideation and thoughts of harm to self and others. Problem: Anxiety  Goal: Will report anxiety at manageable levels  Description: INTERVENTIONS:  1. Administer medication as ordered  2. Teach and rehearse alternative coping skills  3. Provide emotional support with 1:1 interaction with staff  Note: Mr. Pascual Rahman continues to feel anxious. He has not requested PRN Atarax during this shift. He is friendly with peers and has attended therapeutic group activities.
Problem: Self Harm/Suicidality  Goal: Will have no self-injury during hospital stay  Description: INTERVENTIONS:  1. Ensure constant observer at bedside with Q15M safety checks  2. Maintain a safe environment  3. Secure patient belongings  4. Ensure family/visitors adhere to safety recommendations  5. Ensure safety tray has been added to patient's diet order  6. Every shift and PRN: Re-assess suicidal risk via Frequent Screener    Outcome: Progressing  Flowsheets (Taken 3/24/2023 1202)  Will have no self-injury during hospital stay: Maintain a safe environment  Note: Patient flat, and evasive during 1:1, patient up in day area for needs only. Patient denies having thoughts of self harm, no thoughts of harming others. Patient denies hallucinations at this time. Patient reports that he is only concerns about getting his medications together. Patient identifies support system. Instructed on seeking help when needed, patient verbalizes understanding and agrees. Visual checks maintained.
Problem: Self Harm/Suicidality  Goal: Will have no self-injury during hospital stay  Description: INTERVENTIONS:  1. Ensure constant observer at bedside with Q15M safety checks  2. Maintain a safe environment  3. Secure patient belongings  4. Ensure family/visitors adhere to safety recommendations  5. Ensure safety tray has been added to patient's diet order  6. Every shift and PRN: Re-assess suicidal risk via Frequent Screener    Outcome: Progressing  Flowsheets (Taken 3/25/2023 0333)  Will have no self-injury during hospital stay:   Maintain a safe environment   Secure patient belongings   Every shift and PRN: Re-assess suicidal risk via Frequent Screener  Note: Pt reports depression and anxiety. Pt denies any suicidal or homicidal ideations, denies any hallucinations. Pt agreed to seek staff and report any intrusive thoughts of hurting self or others. Will continue to provide emotional support and reassurance as needed. Pt remains free from any self-harm, safe environment maintained. Regular rounding 4 times an hour and spontaneous patient checks done for safety and per unit policy. Problem: Anxiety  Goal: Will report anxiety at manageable levels  Description: INTERVENTIONS:  1. Administer medication as ordered  2. Teach and rehearse alternative coping skills  3. Provide emotional support with 1:1 interaction with staff  Outcome: Progressing  Flowsheets (Taken 3/25/2023 0333)  Will report anxiety at manageable levels:   Administer medication as ordered   Provide emotional support with 1:1 interaction with staff   Teach and rehearse alternative coping skills  Note: Patient reports anxiety, having trouble calming down in order to rest this evening. PRN medication was given as ordered for increased anxiety.
Judgment: No  Insight and Judgment: Poor judgment, Poor insight    EDUCATION:   Learner Progress Toward Treatment Goals: reviewed group plans and strategies for care    Method:group therapy, medication compliance, individualized assessments and care planning    Outcome: needs reinforcement    PATIENT GOALS: to be discussed with patient within 72 hours    PLAN/TREATMENT RECOMMENDATIONS:     continue group therapy , medications compliance, goal setting, individualized assessments and care, continue to monitor pt on unit      SHORT-TERM GOALS: medication stabilization  Time frame for Short-Term Goals: 5-7 days    LONG-TERM GOALS: get linked with therapies, continue seeing psych NP  Time frame for Long-Term Goals: 6 months  Members Present in Team Meeting: See Signature Sheet    Terra Barba RN

## 2023-03-27 NOTE — DISCHARGE SUMMARY
mouth nightly as needed for Sleep  Qty: 14 tablet, Refills: 0      lisinopril (PRINIVIL;ZESTRIL) 5 MG tablet Take 1 tablet by mouth daily  Qty: 30 tablet, Refills: 0      doxycycline monohydrate (MONODOX) 100 MG capsule Take 1 capsule by mouth every 12 hours for 10 doses  Qty: 10 capsule, Refills: 0      pantoprazole (PROTONIX) 40 MG tablet Take 1 tablet by mouth every morning (before breakfast)  Qty: 30 tablet, Refills: 0           CONTINUE these medications which have CHANGED    Details   ARIPiprazole (ABILIFY) 5 MG tablet Take 1 tablet by mouth at bedtime  Qty: 30 tablet, Refills: 0           CONTINUE these medications which have NOT CHANGED    Details   Multiple Vitamins-Minerals (THERAPEUTIC MULTIVITAMIN-MINERALS) tablet Take 1 tablet by mouth daily      Testosterone Cypionate 200 MG/ML SOLN Inject 300 mg into the muscle once a week Indications: Mondays      amphetamine-dextroamphetamine (ADDERALL, 20MG,) 20 MG tablet Take 20 mg by mouth daily. amphetamine-dextroamphetamine (ADDERALL) 10 MG tablet Take 10 mg by mouth every evening.            STOP taking these medications       omeprazole (PRILOSEC) 40 MG delayed release capsule Comments:   Reason for Stopping:                Core Measures statement:   Not applicable    Discharge Exam:  Level of consciousness:  Within normal limits  Appearance: Street clothes, seated, with good grooming  Behavior/Motor: No abnormalities noted  Attitude toward examiner:  Cooperative, attentive, good eye contact  Speech:  spontaneous, normal rate, normal volume and well articulated  Mood:  euthymic  Affect:  Full range  Thought processes:  linear, goal directed and coherent  Thought content:  denies homicidal ideation  Suicidal Ideation:  denies suicidal ideation  Delusions:  no evidence of delusions  Perceptual Disturbance:  denies any perceptual disturbance  Cognition:  Intact  Memory: age appropriate  Insight & Judgement: fair  Medication side effects: denies

## 2023-03-27 NOTE — BH NOTE
[facility]  Discharge Note     Pt belongings: Retrieved from room/safe, reviewed and packed to take with. Dental Appliances: None  Vision - Corrective Lenses: Eyeglasses  Hearing Aid: None  Jewelry: None  Body Piercings Removed: N/A  Clothing: Belt, Footwear, Pants  Other Valuables: Money, Wallet (1.25)       Patient discharged to private residence, picked up by friend. Instructed on discharge instructions, pt verbalizes understanding and signs AVS. Pt in control at time of discharge. Pt ambulates to [facility] main entrance with psych staff x2. Rx filled and sent with patient. No complaints voiced at this time. Status EXAM upon discharge:  Mental Status and Behavioral Exam  Normal: No  Level of Assistance: Independent/Self  Facial Expression: Flat  Affect: Appropriate  Level of Consciousness: Alert  Frequency of Checks: 4 times per hour, close  Mood:Normal: No  Mood: Anxious  Motor Activity:Normal: Yes  Eye Contact: Good  Observed Behavior: Cooperative  Sexual Misconduct History: Current - no  Preception: [place] to person, [place] to time, [place] to place  Attention:Normal: Yes  Attention: Distractible  Thought Processes: Circumstantial  Thought Content:Normal: No  Thought Content: Preoccupations  Depression Symptoms: No problems reported or observed. Anxiety Symptoms: No problems reported or observed. Teri Symptoms: No problems reported or observed.   Hallucinations: None  Delusions: No  Memory:Normal: Yes  Memory: Poor recent  Insight and Judgment: No  Insight and Judgment: Poor judgment, Poor insight    Bronson Velez LPN
[facility]  Admission Note     Admission Type:   Admission Type: Voluntary    Reason for admission:  Reason for Admission: Suicidal thoughts with plan to overdose, lost relationship, house, car. Depressed and has been off meds for 4 weeks. Addictive Behavior:   Addictive Behavior  In the Past 3 Months, Have You Felt or Has Someone Told You That You Have a Problem With  : None    Medical Problems:   Past Medical History:   Diagnosis Date    ADD (attention deficit disorder)     not sure of diag, was on adderall in college, then approx 6 mos util 3/2017    Cocaine abuse ([facility] [address]     Concussion     Depressive disorder     was put on med at time hospitalization, [facility], took celexa x sev wks only. Murmur        Status EXAM:  Mental Status and Behavioral Exam  Normal: No  Level of Assistance: Independent/Self  Facial Expression: Worried  Affect: Appropriate  Level of Consciousness: Alert  Frequency of Checks: 4 times per hour, close  Mood:Normal: No  Mood: Depressed, Anxious  Motor Activity:Normal: Yes  Eye Contact: Good  Observed Behavior: Preoccupied, Cooperative, Guarded  Sexual Misconduct History: Current - no  Preception: [place] to person, [place] to time, [place] to place, [place] to situation  Attention:Normal: No  Attention: Distractible  Thought Processes: Circumstantial  Thought Content:Normal: No  Thought Content: Preoccupations  Depression Symptoms: Feelings of helplessness, Feelings of hopelessess  Anxiety Symptoms: Generalized  Teri Symptoms: No problems reported or observed.   Hallucinations: None  Delusions: No  Memory:Normal: Yes  Insight and Judgment: No  Insight and Judgment: Poor judgment, Poor insight    Tobacco Screening:  Practical Counseling, on admission, juan jose X, if applicable and completed (first 3 are required if patient doesn't refuse):            ( ) Recognizing danger situations (included triggers and roadblocks)                    ( ) Coping skills (new ways to
BINTA revoked for Brittnee/ girlfriend.
Patient given tobacco quitline number 4-227.873.3394 at this time, refusing to call at this time, states \" I just dont want to quit now\"- patient given information as to the dangers of long term tobacco use. Continue to reinforce the importance of tobacco cessation.
Team Meeting:   RN, SW, RT, patient  Guerita Myers RN

## 2023-03-28 NOTE — CARE COORDINATION
BHI Biopsychosocial Assessment    Current Level of Psychosocial Functioning     Independent   Dependent  XX   Minimal Assist       Psychosocial High Risk Factors (check all that apply)    Unable to obtain meds   Chronic illness/pain    Substance abuse   Lack of Family Support   Financial stress XX   Isolation   Inadequate Community Resources  Suicide attempt(s)  Not taking medications XX   Victim of crime   Developmental Delay  Unable to manage personal needs    Age 72 or older   Homeless  No transportation  XX   Readmission within 30 days  Unemployment XX   Traumatic Event    Psychiatric Advanced Directives: denies     Family to Involve in Treatment: Patient does not identify any family to involve in treatment at time of assessment     Sexual Orientation:  NA    Patient Strengths: Patient has insurance; Patient has stable housing; Patient is linked with [employer] [facility] Vencesjett; Patient has supportive mother     Patient Barriers: poor coping skills; non compliance with prescribed medications; recent job loss; relationship issues       Opiate Education Provided:  patient denied need for AOD education       CMHC/mental health history: Patient reports 1 previous psychiatric admission in 2017. Patient is linked with [facility]     Plan of Care   medication management, group/individual therapies, family meetings, psycho -education, treatment team meetings to assist with stabilization    Initial Discharge Plan:  Patient plans to return to [facility] and follow up with [facility]       Clinical Summary:  Patient is a 26 y.o. male admitted to the [facility] for depression with suicidal ideation. Patient reported a plan to overdose on medications. Patient reports an increase in depression due to a recent breakup, loss of employment, loss of transportation and being kicked out of his home. Patient reports currently fleeting suicidal ideation and is hopeful that restart medications will help them to subside.  Patient is linked with [facility] and reports that he
release capsule  Commonly known as: PRILOSEC  Replaced by: pantoprazole 40 MG tablet               Where to Get Your Medications        These medications were sent to [facility] [place] [address], [place] [address] [place] [id number]      Phone: [phone]   ARIPiprazole 5 MG tablet  doxycycline monohydrate 100 MG capsule  hydrOXYzine HCl 50 MG tablet  lisinopril 5 MG tablet  pantoprazole 40 MG tablet  traZODone 50 MG tablet         Follow Up Appointment: [facility]  JES[address] [id number]    Follow up on 3/29/2023  @1130am with Slick Dunham NP

## 2023-06-14 ENCOUNTER — HOSPITAL ENCOUNTER (INPATIENT)
Age: 33
LOS: 6 days | Discharge: HOME | DRG: 751 | End: 2023-06-20
Attending: EMERGENCY MEDICINE | Admitting: PSYCHIATRY & NEUROLOGY
Payer: MEDICAID

## 2023-06-14 ENCOUNTER — HOSPITAL ENCOUNTER (EMERGENCY)
Dept: VASCULAR LAB | Age: 33
Discharge: HOME | DRG: 751 | End: 2023-06-14
Payer: MEDICAID

## 2023-06-14 DIAGNOSIS — R45.851 DEPRESSION WITH SUICIDAL IDEATION: Primary | ICD-10-CM

## 2023-06-14 DIAGNOSIS — R60.0 LEG EDEMA, LEFT: ICD-10-CM

## 2023-06-14 DIAGNOSIS — F32.A DEPRESSION WITH SUICIDAL IDEATION: Primary | ICD-10-CM

## 2023-06-14 LAB
ALBUMIN SERPL-MCNC: 4.3 G/DL (ref 3.5–5.2)
ALP SERPL-CCNC: 103 U/L (ref 40–129)
ALT SERPL-CCNC: 32 U/L (ref 5–41)
AMPHET UR QL SCN: POSITIVE
ANION GAP SERPL CALCULATED.3IONS-SCNC: 7 MMOL/L (ref 9–17)
AST SERPL-CCNC: 24 U/L
BARBITURATES UR QL SCN: NEGATIVE
BASOPHILS # BLD: 0.1 K/UL (ref 0–0.2)
BASOPHILS NFR BLD: 1 % (ref 0–2)
BENZODIAZ UR QL: NEGATIVE
BILIRUB SERPL-MCNC: 0.4 MG/DL (ref 0.3–1.2)
BUN SERPL-MCNC: 17 MG/DL (ref 6–20)
CALCIUM SERPL-MCNC: 9.6 MG/DL (ref 8.6–10.4)
CANNABINOID SCREEN URINE: NEGATIVE
CHLORIDE SERPL-SCNC: 104 MMOL/L (ref 98–107)
CO2 SERPL-SCNC: 30 MMOL/L (ref 20–31)
COCAINE UR QL SCN: NEGATIVE
CREAT SERPL-MCNC: 0.77 MG/DL (ref 0.7–1.2)
EOSINOPHIL # BLD: 0.6 K/UL (ref 0–0.4)
EOSINOPHILS RELATIVE PERCENT: 7 % (ref 0–4)
ERYTHROCYTE [DISTWIDTH] IN BLOOD BY AUTOMATED COUNT: 13.9 % (ref 11.5–14.9)
ETHANOL PERCENT: <0.01 %
ETHANOLAMINE SERPL-MCNC: <10 MG/DL
FENTANYL URINE: NEGATIVE
GFR SERPL CREATININE-BSD FRML MDRD: >60 ML/MIN/1.73M2
GLUCOSE SERPL-MCNC: 91 MG/DL (ref 70–99)
HCT VFR BLD AUTO: 46.1 % (ref 41–53)
HGB BLD-MCNC: 15.3 G/DL (ref 13.5–17.5)
LYMPHOCYTES # BLD: 16 % (ref 24–44)
LYMPHOCYTES NFR BLD: 1.3 K/UL (ref 1–4.8)
MCH RBC QN AUTO: 28.3 PG (ref 26–34)
MCHC RBC AUTO-ENTMCNC: 33.1 G/DL (ref 31–37)
MCV RBC AUTO: 85.3 FL (ref 80–100)
METHADONE SCREEN, URINE: NEGATIVE
MONOCYTES NFR BLD: 0.7 K/UL (ref 0.1–1.3)
MONOCYTES NFR BLD: 9 % (ref 1–7)
NEUTROPHILS NFR BLD: 67 % (ref 36–66)
NEUTS SEG NFR BLD: 5.5 K/UL (ref 1.3–9.1)
OPIATES UR QL SCN: NEGATIVE
OXYCODONE SCREEN URINE: NEGATIVE
PCP UR QL SCN: NEGATIVE
PLATELET # BLD AUTO: 234 K/UL (ref 150–450)
PMV BLD AUTO: 9.1 FL (ref 6–12)
POTASSIUM SERPL-SCNC: 3.6 MMOL/L (ref 3.7–5.3)
PROT SERPL-MCNC: 6.6 G/DL (ref 6.4–8.3)
RBC # BLD AUTO: 5.4 M/UL (ref 4.5–5.9)
SODIUM SERPL-SCNC: 141 MMOL/L (ref 135–144)
TEST INFORMATION: ABNORMAL
WBC OTHER # BLD: 8.1 K/UL (ref 3.5–11)

## 2023-06-14 PROCEDURE — 93882 EXTRACRANIAL UNI/LTD STUDY: CPT

## 2023-06-14 PROCEDURE — 6370000000 HC RX 637 (ALT 250 FOR IP): Performed by: NURSE PRACTITIONER

## 2023-06-14 PROCEDURE — G0480 DRUG TEST DEF 1-7 CLASSES: HCPCS

## 2023-06-14 PROCEDURE — 80307 DRUG TEST PRSMV CHEM ANLYZR: CPT

## 2023-06-14 PROCEDURE — 1240000000 HC EMOTIONAL WELLNESS R&B

## 2023-06-14 PROCEDURE — 80053 COMPREHEN METABOLIC PANEL: CPT

## 2023-06-14 PROCEDURE — 85027 COMPLETE CBC AUTOMATED: CPT

## 2023-06-14 PROCEDURE — 6370000000 HC RX 637 (ALT 250 FOR IP): Performed by: EMERGENCY MEDICINE

## 2023-06-14 PROCEDURE — 36415 COLL VENOUS BLD VENIPUNCTURE: CPT

## 2023-06-14 PROCEDURE — 99285 EMERGENCY DEPT VISIT HI MDM: CPT

## 2023-06-14 RX ORDER — MAGNESIUM HYDROXIDE/ALUMINUM HYDROXICE/SIMETHICONE 120; 1200; 1200 MG/30ML; MG/30ML; MG/30ML
30 SUSPENSION ORAL EVERY 6 HOURS PRN
Status: DISCONTINUED | OUTPATIENT
Start: 2023-06-14 | End: 2023-06-20 | Stop reason: HOSPADM

## 2023-06-14 RX ORDER — POLYETHYLENE GLYCOL 3350 17 G/17G
17 POWDER, FOR SOLUTION ORAL DAILY PRN
Status: DISCONTINUED | OUTPATIENT
Start: 2023-06-14 | End: 2023-06-20 | Stop reason: HOSPADM

## 2023-06-14 RX ORDER — HYDROXYZINE 50 MG/1
50 TABLET, FILM COATED ORAL 3 TIMES DAILY PRN
Status: DISCONTINUED | OUTPATIENT
Start: 2023-06-14 | End: 2023-06-20 | Stop reason: HOSPADM

## 2023-06-14 RX ORDER — TRAZODONE HYDROCHLORIDE 50 MG/1
50 TABLET ORAL NIGHTLY PRN
Status: DISCONTINUED | OUTPATIENT
Start: 2023-06-14 | End: 2023-06-20 | Stop reason: HOSPADM

## 2023-06-14 RX ORDER — POLYETHYLENE GLYCOL 3350 17 G
2 POWDER IN PACKET (EA) ORAL
Status: DISCONTINUED | OUTPATIENT
Start: 2023-06-14 | End: 2023-06-20 | Stop reason: HOSPADM

## 2023-06-14 RX ORDER — ACETAMINOPHEN 500 MG
1000 TABLET ORAL ONCE
Status: COMPLETED | OUTPATIENT
Start: 2023-06-14 | End: 2023-06-14

## 2023-06-14 RX ORDER — ACETAMINOPHEN 325 MG/1
650 TABLET ORAL EVERY 4 HOURS PRN
Status: DISCONTINUED | OUTPATIENT
Start: 2023-06-14 | End: 2023-06-20 | Stop reason: HOSPADM

## 2023-06-14 RX ADMIN — ACETAMINOPHEN 650 MG: 325 TABLET ORAL at 18:20

## 2023-06-14 RX ADMIN — HYDROXYZINE HYDROCHLORIDE 50 MG: 50 TABLET, FILM COATED ORAL at 18:20

## 2023-06-14 RX ADMIN — ACETAMINOPHEN 1000 MG: 500 TABLET ORAL at 16:14

## 2023-06-14 ASSESSMENT — PAIN DESCRIPTION - LOCATION
LOCATION: ANKLE
LOCATION: LEG
LOCATION: LEG

## 2023-06-14 ASSESSMENT — PAIN DESCRIPTION - ORIENTATION
ORIENTATION: LEFT
ORIENTATION: RIGHT;LEFT
ORIENTATION: LEFT;RIGHT

## 2023-06-14 ASSESSMENT — LIFESTYLE VARIABLES
HOW OFTEN DO YOU HAVE A DRINK CONTAINING ALCOHOL: NEVER
HOW MANY STANDARD DRINKS CONTAINING ALCOHOL DO YOU HAVE ON A TYPICAL DAY: PATIENT DOES NOT DRINK
HOW OFTEN DO YOU HAVE A DRINK CONTAINING ALCOHOL: NEVER
HOW MANY STANDARD DRINKS CONTAINING ALCOHOL DO YOU HAVE ON A TYPICAL DAY: PATIENT DOES NOT DRINK

## 2023-06-14 ASSESSMENT — PAIN DESCRIPTION - DESCRIPTORS
DESCRIPTORS: ACHING;TIGHTNESS
DESCRIPTORS: DULL
DESCRIPTORS: DULL

## 2023-06-14 ASSESSMENT — PATIENT HEALTH QUESTIONNAIRE - PHQ9: SUM OF ALL RESPONSES TO PHQ QUESTIONS 1-9: 27

## 2023-06-14 ASSESSMENT — PAIN - FUNCTIONAL ASSESSMENT
PAIN_FUNCTIONAL_ASSESSMENT: NONE - DENIES PAIN
PAIN_FUNCTIONAL_ASSESSMENT: ACTIVITIES ARE NOT PREVENTED
PAIN_FUNCTIONAL_ASSESSMENT: ACTIVITIES ARE NOT PREVENTED

## 2023-06-14 ASSESSMENT — PAIN SCALES - GENERAL
PAINLEVEL_OUTOF10: 3
PAINLEVEL_OUTOF10: 8
PAINLEVEL_OUTOF10: 4
PAINLEVEL_OUTOF10: 3
PAINLEVEL_OUTOF10: 5

## 2023-06-14 ASSESSMENT — SLEEP AND FATIGUE QUESTIONNAIRES
DO YOU USE A SLEEP AID: NO
AVERAGE NUMBER OF SLEEP HOURS: 6
DO YOU HAVE DIFFICULTY SLEEPING: NO

## 2023-06-15 PROCEDURE — 6370000000 HC RX 637 (ALT 250 FOR IP): Performed by: NURSE PRACTITIONER

## 2023-06-15 PROCEDURE — 90792 PSYCH DIAG EVAL W/MED SRVCS: CPT | Performed by: PSYCHIATRY & NEUROLOGY

## 2023-06-15 PROCEDURE — 1240000000 HC EMOTIONAL WELLNESS R&B

## 2023-06-15 PROCEDURE — 99222 1ST HOSP IP/OBS MODERATE 55: CPT | Performed by: INTERNAL MEDICINE

## 2023-06-15 PROCEDURE — 6360000002 HC RX W HCPCS: Performed by: PSYCHIATRY & NEUROLOGY

## 2023-06-15 PROCEDURE — 6370000000 HC RX 637 (ALT 250 FOR IP): Performed by: INTERNAL MEDICINE

## 2023-06-15 PROCEDURE — 6370000000 HC RX 637 (ALT 250 FOR IP): Performed by: PSYCHIATRY & NEUROLOGY

## 2023-06-15 RX ORDER — LISINOPRIL 5 MG/1
5 TABLET ORAL DAILY
Status: DISCONTINUED | OUTPATIENT
Start: 2023-06-15 | End: 2023-06-20 | Stop reason: HOSPADM

## 2023-06-15 RX ORDER — TESTOSTERONE CYPIONATE 200 MG/ML
300 INJECTION, SOLUTION INTRAMUSCULAR WEEKLY
Status: DISCONTINUED | OUTPATIENT
Start: 2023-06-15 | End: 2023-06-20 | Stop reason: HOSPADM

## 2023-06-15 RX ORDER — TESTOSTERONE CYPIONATE 200 MG/ML
300 INJECTION, SOLUTION INTRAMUSCULAR WEEKLY
Status: DISCONTINUED | OUTPATIENT
Start: 2023-06-19 | End: 2023-06-15

## 2023-06-15 RX ORDER — PANTOPRAZOLE SODIUM 40 MG/1
40 TABLET, DELAYED RELEASE ORAL
Status: DISCONTINUED | OUTPATIENT
Start: 2023-06-16 | End: 2023-06-20 | Stop reason: HOSPADM

## 2023-06-15 RX ORDER — M-VIT,TX,IRON,MINS/CALC/FOLIC 27MG-0.4MG
1 TABLET ORAL DAILY
Status: DISCONTINUED | OUTPATIENT
Start: 2023-06-15 | End: 2023-06-20 | Stop reason: HOSPADM

## 2023-06-15 RX ORDER — ARIPIPRAZOLE 5 MG/1
5 TABLET ORAL NIGHTLY
Status: DISCONTINUED | OUTPATIENT
Start: 2023-06-15 | End: 2023-06-20 | Stop reason: HOSPADM

## 2023-06-15 RX ADMIN — ARIPIPRAZOLE 5 MG: 5 TABLET ORAL at 21:13

## 2023-06-15 RX ADMIN — LISINOPRIL 5 MG: 5 TABLET ORAL at 17:23

## 2023-06-15 RX ADMIN — HYDROXYZINE HYDROCHLORIDE 50 MG: 50 TABLET, FILM COATED ORAL at 21:13

## 2023-06-15 RX ADMIN — TRAZODONE HYDROCHLORIDE 50 MG: 50 TABLET ORAL at 21:13

## 2023-06-15 RX ADMIN — MULTIPLE VITAMINS W/ MINERALS TAB 1 TABLET: TAB at 11:04

## 2023-06-15 RX ADMIN — TESTOSTERONE CYPIONATE 300 MG: 200 INJECTION, SOLUTION INTRAMUSCULAR at 17:54

## 2023-06-15 RX ADMIN — LISDEXAMFETAMINE DIMESYLATE 50 MG: 50 CAPSULE ORAL at 11:04

## 2023-06-16 PROCEDURE — 99232 SBSQ HOSP IP/OBS MODERATE 35: CPT | Performed by: PSYCHIATRY & NEUROLOGY

## 2023-06-16 PROCEDURE — 6370000000 HC RX 637 (ALT 250 FOR IP): Performed by: PSYCHIATRY & NEUROLOGY

## 2023-06-16 PROCEDURE — 6370000000 HC RX 637 (ALT 250 FOR IP): Performed by: NURSE PRACTITIONER

## 2023-06-16 PROCEDURE — 90833 PSYTX W PT W E/M 30 MIN: CPT | Performed by: PSYCHIATRY & NEUROLOGY

## 2023-06-16 PROCEDURE — 6370000000 HC RX 637 (ALT 250 FOR IP): Performed by: INTERNAL MEDICINE

## 2023-06-16 PROCEDURE — 1240000000 HC EMOTIONAL WELLNESS R&B

## 2023-06-16 RX ADMIN — LISDEXAMFETAMINE DIMESYLATE 50 MG: 50 CAPSULE ORAL at 10:02

## 2023-06-16 RX ADMIN — PANTOPRAZOLE SODIUM 40 MG: 40 TABLET, DELAYED RELEASE ORAL at 06:54

## 2023-06-16 RX ADMIN — TRAZODONE HYDROCHLORIDE 50 MG: 50 TABLET ORAL at 22:03

## 2023-06-16 RX ADMIN — LISINOPRIL 5 MG: 5 TABLET ORAL at 10:02

## 2023-06-16 RX ADMIN — MULTIPLE VITAMINS W/ MINERALS TAB 1 TABLET: TAB at 10:02

## 2023-06-16 RX ADMIN — ARIPIPRAZOLE 5 MG: 5 TABLET ORAL at 22:03

## 2023-06-16 RX ADMIN — HYDROXYZINE HYDROCHLORIDE 50 MG: 50 TABLET, FILM COATED ORAL at 22:03

## 2023-06-16 ASSESSMENT — PAIN SCALES - GENERAL: PAINLEVEL_OUTOF10: 0

## 2023-06-17 PROCEDURE — 6370000000 HC RX 637 (ALT 250 FOR IP): Performed by: PSYCHIATRY & NEUROLOGY

## 2023-06-17 PROCEDURE — 6370000000 HC RX 637 (ALT 250 FOR IP): Performed by: NURSE PRACTITIONER

## 2023-06-17 PROCEDURE — 1240000000 HC EMOTIONAL WELLNESS R&B

## 2023-06-17 PROCEDURE — 6370000000 HC RX 637 (ALT 250 FOR IP): Performed by: INTERNAL MEDICINE

## 2023-06-17 RX ORDER — SERTRALINE HYDROCHLORIDE 25 MG/1
25 TABLET, FILM COATED ORAL DAILY
Status: DISCONTINUED | OUTPATIENT
Start: 2023-06-17 | End: 2023-06-18

## 2023-06-17 RX ADMIN — PANTOPRAZOLE SODIUM 40 MG: 40 TABLET, DELAYED RELEASE ORAL at 10:36

## 2023-06-17 RX ADMIN — LISDEXAMFETAMINE DIMESYLATE 50 MG: 50 CAPSULE ORAL at 10:36

## 2023-06-17 RX ADMIN — HYDROXYZINE HYDROCHLORIDE 50 MG: 50 TABLET, FILM COATED ORAL at 20:43

## 2023-06-17 RX ADMIN — SERTRALINE 25 MG: 25 TABLET, FILM COATED ORAL at 20:50

## 2023-06-17 RX ADMIN — TRAZODONE HYDROCHLORIDE 50 MG: 50 TABLET ORAL at 20:43

## 2023-06-17 RX ADMIN — MULTIPLE VITAMINS W/ MINERALS TAB 1 TABLET: TAB at 10:36

## 2023-06-17 RX ADMIN — ARIPIPRAZOLE 5 MG: 5 TABLET ORAL at 20:43

## 2023-06-17 RX ADMIN — LISINOPRIL 5 MG: 5 TABLET ORAL at 10:36

## 2023-06-17 ASSESSMENT — PAIN SCALES - GENERAL
PAINLEVEL_OUTOF10: 0
PAINLEVEL_OUTOF10: 0

## 2023-06-18 PROCEDURE — 6370000000 HC RX 637 (ALT 250 FOR IP): Performed by: NURSE PRACTITIONER

## 2023-06-18 PROCEDURE — 1240000000 HC EMOTIONAL WELLNESS R&B

## 2023-06-18 PROCEDURE — 6370000000 HC RX 637 (ALT 250 FOR IP): Performed by: INTERNAL MEDICINE

## 2023-06-18 PROCEDURE — 6370000000 HC RX 637 (ALT 250 FOR IP): Performed by: PSYCHIATRY & NEUROLOGY

## 2023-06-18 RX ADMIN — LISDEXAMFETAMINE DIMESYLATE 50 MG: 50 CAPSULE ORAL at 10:46

## 2023-06-18 RX ADMIN — TRAZODONE HYDROCHLORIDE 50 MG: 50 TABLET ORAL at 20:56

## 2023-06-18 RX ADMIN — HYDROXYZINE HYDROCHLORIDE 50 MG: 50 TABLET, FILM COATED ORAL at 20:55

## 2023-06-18 RX ADMIN — MULTIPLE VITAMINS W/ MINERALS TAB 1 TABLET: TAB at 10:46

## 2023-06-18 RX ADMIN — LISINOPRIL 5 MG: 5 TABLET ORAL at 10:46

## 2023-06-18 RX ADMIN — SERTRALINE 25 MG: 25 TABLET, FILM COATED ORAL at 10:46

## 2023-06-18 RX ADMIN — ARIPIPRAZOLE 5 MG: 5 TABLET ORAL at 20:55

## 2023-06-18 ASSESSMENT — PAIN SCALES - GENERAL
PAINLEVEL_OUTOF10: 0
PAINLEVEL_OUTOF10: 0

## 2023-06-19 PROCEDURE — 6370000000 HC RX 637 (ALT 250 FOR IP): Performed by: NURSE PRACTITIONER

## 2023-06-19 PROCEDURE — 1240000000 HC EMOTIONAL WELLNESS R&B

## 2023-06-19 PROCEDURE — 6370000000 HC RX 637 (ALT 250 FOR IP): Performed by: INTERNAL MEDICINE

## 2023-06-19 PROCEDURE — 6370000000 HC RX 637 (ALT 250 FOR IP): Performed by: PSYCHIATRY & NEUROLOGY

## 2023-06-19 RX ORDER — TRAZODONE HYDROCHLORIDE 50 MG/1
50 TABLET ORAL NIGHTLY PRN
Qty: 30 TABLET | Refills: 0 | Status: SHIPPED | OUTPATIENT
Start: 2023-06-19

## 2023-06-19 RX ORDER — ARIPIPRAZOLE 5 MG/1
5 TABLET ORAL NIGHTLY
Qty: 30 TABLET | Refills: 0 | Status: SHIPPED | OUTPATIENT
Start: 2023-06-19

## 2023-06-19 RX ORDER — HYDROXYZINE 50 MG/1
50 TABLET, FILM COATED ORAL 3 TIMES DAILY PRN
Qty: 30 TABLET | Refills: 0 | Status: SHIPPED | OUTPATIENT
Start: 2023-06-19 | End: 2023-06-29

## 2023-06-19 RX ADMIN — MULTIPLE VITAMINS W/ MINERALS TAB 1 TABLET: TAB at 08:26

## 2023-06-19 RX ADMIN — TRAZODONE HYDROCHLORIDE 50 MG: 50 TABLET ORAL at 20:39

## 2023-06-19 RX ADMIN — LISINOPRIL 5 MG: 5 TABLET ORAL at 08:27

## 2023-06-19 RX ADMIN — HYDROXYZINE HYDROCHLORIDE 50 MG: 50 TABLET, FILM COATED ORAL at 20:39

## 2023-06-19 RX ADMIN — LISDEXAMFETAMINE DIMESYLATE 50 MG: 50 CAPSULE ORAL at 08:27

## 2023-06-19 RX ADMIN — ARIPIPRAZOLE 5 MG: 5 TABLET ORAL at 20:39

## 2023-06-19 RX ADMIN — PANTOPRAZOLE SODIUM 40 MG: 40 TABLET, DELAYED RELEASE ORAL at 08:26

## 2023-06-19 RX ADMIN — SERTRALINE 50 MG: 50 TABLET, FILM COATED ORAL at 08:26

## 2023-06-19 ASSESSMENT — PAIN SCALES - GENERAL
PAINLEVEL_OUTOF10: 0
PAINLEVEL_OUTOF10: 0

## 2023-06-19 NOTE — GROUP NOTE
Group Note    6/19/23           Start time: 10:01 AM      Number of participants in Group & unit census: 6/21 Goals Group     Topic:Goals Group     Goal of Group:Daily Goal     Comments:  Patient did not participate in Goals group, despite staff encouragement and explanation of benefits. Patient remains seclusive to self. Every 15 minute safety checks maintained for patient safety and will continue to encourage patient to attend unit programming.

## 2023-06-19 NOTE — GROUP NOTE
Psych-Ed/Relapse Prevention Group Note        Date: June 19, 2023 Start Time: 1:30pm  End Time:  2:15pm      Number of Participants in Group & Unit Census:  9/20    Topic:Leisure skills    Goal of Group:Patient will identify benefits of leisure skills for coping      Comments:     Patient did not participate in Psych-Ed/Relapse Prevention group, despite staff encouragement and explanation of benefits. Patient remain seclusive to self. Q15 minute safety checks maintained for patient safety and will continue to encourage patient to attend unit programming.          Signature:  Sonia Sargent, [address]

## 2023-06-19 NOTE — PROGRESS NOTES
06/19/23 1159   Encounter Summary   Encounter Overview/Reason   Encounter   Service Provided For: Patient   Referral/Consult From: Radha   Last Encounter  06/19/23   Complexity of Encounter Low   Spiritual/Emotional needs   Type Spiritual Support   Rituals, Rites and Sacraments   Type Anointing  (Jeremiah Mahajan 6/19/23)

## 2023-06-19 NOTE — PROGRESS NOTES
Daily Progress Note  6/19/2023    CHIEF COMPLAINT: Depression with suicidal ideation    Reviewed patient's current plan of care and vital signs with nursing staff. Sleep:  several hours last night  Attending groups: No:     SUBJECTIVE:      Patient seen face-to-face for follow-up assessment. He has been compliant with his scheduled psychotropic medications and is denying any side effects. Tolerating medication well. Patient reporting improvement in his suicidal thoughts. Denies any perceptual disturbances or psychotic phenomena. Patient expressing interest in outpatient therapy, but denying intent for inpatient AOD treatment. Patient symptoms are improving. Plan to discharge tomorrow with continued stability. Mental Status Exam  Level of consciousness:  Within normal limits  Appearance: Hospital attire, resting in bed, with good grooming and hygiene   Behavior/Motor: No abnormalities noted  Attitude toward examiner:  Cooperative, attentive, good eye contact  Speech:  spontaneous, normal rate, normal volume and well articulated  Mood: \"Fine\"  Affect: Irritable  Thought processes:  linear, goal directed and coherent  Thought content:  denies homicidal ideation  Suicidal Ideation: Reporting improvement in suicidal ideation, unable to contract for safety off unit at this time  Delusions:  no evidence of delusions  Perceptual Disturbance:  denies any perceptual disturbance  Cognition:  Oriented to self, location, time, and situation  Memory: age appropriate  Insight & Judgement: improving  Medication side effects:  denies       Data   height is 6' 1\" (1.854 m) and weight is 255 lb (115.7 kg). His oral temperature is 97.9 °F (36.6 °C). His blood pressure is 120/76 and his pulse is 68. His respiration is 14 and oxygen saturation is 99%.    Labs:   Admission on 06/14/2023   Component Date Value Ref Range Status    WBC 06/14/2023 8.1  3.5 - 11.0 k/uL Final    RBC 06/14/2023 5.40  4.5 - 5.9 m/uL Final    Hemoglobin

## 2023-06-19 NOTE — DISCHARGE INSTRUCTIONS
to be in the hospital, or if you have a weakened immune system: You can end isolation at the end of Day 10 or later. Talk to your doctor to find out when it's safe to end isolation. You may need a viral test.  After you end isolation, if your symptoms come back or get worse: Restart your isolation at Day 0. Do this even if it happens after you took medicine for COVID. Avoid travel and stay away from people at high risk for serious disease for at least 10 days. Those who can't wear a mask because they are under 3years old or have certain disabilities should isolate for at least 10 full days. Check the CDC website at cdc.gov for the most current information on how to protect yourself. How can you self-isolate when you have COVID-19? If you have COVID-19, there are things you can do to help avoid spreading the virus to others. Stay home, and avoid contact with other people. Limit contact with people in your home. If possible, stay in a separate bedroom and use a separate bathroom. Wear a high-quality mask when you are around other people. Improve airflow. If you have to spend time indoors with others, open windows and doors. Or you can use a fan to blow air away from people and out a window. Avoid contact with pets and other animals. Cover your mouth and nose with a tissue when you cough or sneeze. Then throw it in the trash right away. Wash your hands often, especially after you cough or sneeze. Use soap and water, and scrub for at least 20 seconds. If soap and water aren't available, use an alcohol-based hand . Don't share personal household items. These include bedding, towels, cups and glasses, and eating utensils. [address] in the warmest water allowed for the fabric type, and dry it completely. It's okay to wash other people's laundry with yours. Clean and disinfect your home. Use household  and disinfectant wipes or sprays.   Go to the [facility] website at cdc.gov if you have

## 2023-06-19 NOTE — PLAN OF CARE
Problem: Pain  Goal: Verbalizes/displays adequate comfort level or baseline comfort level  Outcome: Progressing     Problem: Self Harm/Suicidality  Goal: Will have no self-injury during hospital stay  Description: INTERVENTIONS:  1. Ensure constant observer at bedside with Q15M safety checks  2. Maintain a safe environment  3. Secure patient belongings  4. Ensure family/visitors adhere to safety recommendations  5. Ensure safety tray has been added to patient's diet order  6. Every shift and PRN: Re-assess suicidal risk via Frequent Screener    6/19/2023 0923 by Uma Mohr RN  Outcome: Progressing     Problem: Depression  Goal: Will be euthymic at discharge  Description: INTERVENTIONS:  1. Administer medication as ordered  2. Provide emotional support via 1:1 interaction with staff  3. Encourage involvement in milieu/groups/activities  4. Monitor for social isolation  Outcome: Progressing     Problem: Anxiety  Goal: Will report anxiety at manageable levels  Description: INTERVENTIONS:  1. Administer medication as ordered  2. Teach and rehearse alternative coping skills  3. Provide emotional support with 1:1 interaction with staff  Outcome: Brandy Santoyo (Taken 6/18/2023 1097 by Zainab Dasilva LPN)  Will report anxiety at manageable levels:   Administer medication as ordered   Provide emotional support with 1:1 interaction with staff   Patient has remained calm, cooperative behavior controlled. Patient is eating and sleeping well. Denies any thoughts of self harm, no delusions or hallucinations. Currently denying anxiety and depression. Encouraged to attend groups, discuss feelings, learn and use new coping skills. Safety checks will continue every 15 minutes. Will continue to monitor and support patient.

## 2023-06-19 NOTE — PLAN OF CARE
Problem: Self Harm/Suicidality  Goal: Will have no self-injury during hospital stay  Description: INTERVENTIONS:  1. Ensure constant observer at bedside with Q15M safety checks  2. Maintain a safe environment  3. Secure patient belongings  4. Ensure family/visitors adhere to safety recommendations  5. Ensure safety tray has been added to patient's diet order  6. Every shift and PRN: Re-assess suicidal risk via Frequent Screener    6/18/2023 2345 by Anne Pierre LPN  Outcome: Progressing  Flowsheets (Taken 6/18/2023 2339)  Will have no self-injury during hospital stay:   Ensure constant observer at bedside with Q15M safety checks   Maintain a safe environment   Patient denies any thoughts and or plans to harm himself or anyone else. He is positive cooperative and focused on discharge. Q 15 minute safety checks continue.

## 2023-06-19 NOTE — GROUP NOTE
Psych-Ed/Relapse Prevention Group Note        Date: June 19, 2023 Start Time:  10:30am   End Time:  11:15am      Number of Participants in Group & Unit Census:  12/21    Topic: Socialization and Relationships    Goal of Group:Patient will identify ways to improve relationships and demonstrate improved interpersonal skills      Comments:     Patient did not participate in Psych-Ed/Relapse Prevention group, despite staff encouragement and explanation of benefits. Patient remain seclusive to self. Q15 minute safety checks maintained for patient safety and will continue to encourage patient to attend unit programming.          Signature:  Vila Cogan, [address]

## 2023-06-20 VITALS
DIASTOLIC BLOOD PRESSURE: 76 MMHG | HEIGHT: 73 IN | TEMPERATURE: 96.9 F | OXYGEN SATURATION: 99 % | SYSTOLIC BLOOD PRESSURE: 119 MMHG | WEIGHT: 255 LBS | RESPIRATION RATE: 14 BRPM | HEART RATE: 77 BPM | BODY MASS INDEX: 33.8 KG/M2

## 2023-06-20 PROCEDURE — 6370000000 HC RX 637 (ALT 250 FOR IP): Performed by: PSYCHIATRY & NEUROLOGY

## 2023-06-20 PROCEDURE — 6370000000 HC RX 637 (ALT 250 FOR IP): Performed by: NURSE PRACTITIONER

## 2023-06-20 PROCEDURE — 6370000000 HC RX 637 (ALT 250 FOR IP): Performed by: INTERNAL MEDICINE

## 2023-06-20 RX ADMIN — SERTRALINE 50 MG: 50 TABLET, FILM COATED ORAL at 08:18

## 2023-06-20 RX ADMIN — MULTIPLE VITAMINS W/ MINERALS TAB 1 TABLET: TAB at 08:18

## 2023-06-20 RX ADMIN — PANTOPRAZOLE SODIUM 40 MG: 40 TABLET, DELAYED RELEASE ORAL at 08:18

## 2023-06-20 RX ADMIN — LISINOPRIL 5 MG: 5 TABLET ORAL at 08:18

## 2023-06-20 RX ADMIN — LISDEXAMFETAMINE DIMESYLATE 50 MG: 50 CAPSULE ORAL at 08:18

## 2023-06-20 ASSESSMENT — PAIN SCALES - GENERAL: PAINLEVEL_OUTOF10: 0

## 2023-06-20 NOTE — DISCHARGE SUMMARY
increased activity, or panic attacks. Hospital Course:   Upon admission, Ayla Hernandez was provided a safe secure environment, introduced to unit milieu. Patient participated in groups and individual therapies. Meds were adjusted as noted below. After few days of hospital care, patient began to feel improvement. Depression lifted, thoughts to harm self ceased. Sleep fair, appetite was good. On morning rounds 6/20/2023, Ayla Hernandez endorses feeling ready for discharge. Patient denies suicidal or homicidal ideations, denies hallucinations or delusions. He has some concern that he feels \"activated\" by sertraline. He plans to discuss this with his outpatient provider. It was decided that maximum benefit from hospital care had been achieved and patient can be discharged. Consults:   Internal Medicine    Significant Diagnostic Studies: Routine labs and diagnostics    Treatments: Psychotropic medications, therapy with group, milieu, and 1:1 with nurses, social workers, PACARMELINA/CNP, and Attending physician. Discharge Medications:  Current Discharge Medication List        START taking these medications    Details   hydrOXYzine HCl (ATARAX) 50 MG tablet Take 1 tablet by mouth 3 times daily as needed for Anxiety  Qty: 30 tablet, Refills: 0      sertraline (ZOLOFT) 50 MG tablet Take 1 tablet by mouth daily  Qty: 30 tablet, Refills: 0           CONTINUE these medications which have CHANGED    Details   ARIPiprazole (ABILIFY) 5 MG tablet Take 1 tablet by mouth at bedtime  Qty: 30 tablet, Refills: 0      traZODone (DESYREL) 50 MG tablet Take 1 tablet by mouth nightly as needed for Sleep  Qty: 30 tablet, Refills: 0           CONTINUE these medications which have NOT CHANGED    Details   lisdexamfetamine (VYVANSE) 50 MG capsule Take 1 capsule by mouth every morning.  Max Daily Amount: 50 mg      lisinopril (PRINIVIL;ZESTRIL) 5 MG tablet Take 1 tablet by mouth daily  Qty: 30 tablet, Refills: 0
spontaneous, normal rate, normal volume and well articulated  Mood:  euthymic  Affect:  Full range  Thought processes:  linear, goal directed and coherent  Thought content:  denies homicidal ideation  Suicidal Ideation:  denies suicidal ideation  Delusions:  no evidence of delusions  Perceptual Disturbance:  denies any perceptual disturbance  Cognition:  Intact  Memory: age appropriate  Insight & Judgement: fair  Medication side effects: denies     Disposition: home    Patient Instructions: Activity: activity as tolerated  1. Patient instructed to take medications regularly and follow up with outpatient appointments. Follow-up scheduled with [facility]. Follows up with [facility]. Signed:    Electronically signed by ANGIE Artis CNP on 6/19/23 at 3:07 PM EDT    Time Spent on discharge is more than 30 minutes in the examination, evaluation, counseling and review of medications and discharge plan. An electronic signature was used to authenticate this note. **This report has been created using voice recognition software. It may contain minor errors which are inherent in voice recognition technology. **

## 2023-06-20 NOTE — BH NOTE
[facility]  Discharge Note    Pt discharged with followings belongings:   Dental Appliances: None  Vision - Corrective Lenses: Contact Lenses  Hearing Aid: None  Jewelry: None  Body Piercings Removed: No  Clothing: Footwear, Jacket/Coat, Pants, Shirt, Shorts, Socks, Sweater, Undergarments  Other Valuables: Money, [address] sent home with or returned to patient. Patient educated on aftercare instructions: yes  Information faxed to [facility] by staff  at 3:37 PM .Patient verbalize understanding of AVS:  yes . Status EXAM upon discharge:  Mental Status and Behavioral Exam  Normal: No  Level of Assistance: Independent/Self  Facial Expression: Worried  Affect: Appropriate  Level of Consciousness: Alert  Frequency of Checks: 4 times per hour, close  Mood:Normal: No  Mood: Anxious, Helpless  Motor Activity:Normal: No  Motor Activity: Agitated  Eye Contact: Good  Observed Behavior: Cooperative, Withdrawn  Sexual Misconduct History: Current - no  Preception: [place] to person, [place] to time, [place] to place, [place] to situation  Attention:Normal: Yes  Attention: Distractible  Thought Processes: Loose association (focused on discharge, kept bringing conversation back to discharge)  Thought Content:Normal: No  Thought Content: Preoccupations  Depression Symptoms: Feelings of hopelessess  Anxiety Symptoms: Generalized  Teri Symptoms: No problems reported or observed.   Hallucinations: None  Delusions: No  Memory:Normal: No  Insight and Judgment: No  Insight and Judgment: Poor insight, Poor judgment    Tobacco Screening:  Practical Counseling, on admission, juan jose X, if applicable and completed (first 3 are required if patient doesn't refuse):            ( ) Recognizing danger situations (included triggers and roadblocks)                    ( ) Coping skills (new ways to manage stress,relaxation techniques, changing routine, distraction)                                                           ( ) Basic

## 2023-06-20 NOTE — GROUP NOTE
Psych-Ed/Relapse Prevention Group Note        Date: June 20, 2023 Start Time: 1:30pm  End Time: 2:30pm      Number of Participants in Group & Unit Census:  8/19    Topic: Creative Expression    Goal of Group:Patient will identify benefits of creative expression and sensory activities for coping and relaxation      Comments:     Patient did not participate in Psych-Ed/Relapse Prevention group, despite staff encouragement and explanation of benefits. Patient remain seclusive to self. Q15 minute safety checks maintained for patient safety and will continue to encourage patient to attend unit programming.          Signature:  Esha Merchant, [address]

## 2023-06-20 NOTE — CARE COORDINATION
Name: Antony Mcdermott    : 1990    Discharge Date: 23    Primary Auth/Cert #: .    Destination: Private residence    Discharge Medications:      Medication List        START taking these medications      hydrOXYzine HCl 50 MG tablet  Commonly known as: ATARAX  Take 1 tablet by mouth 3 times daily as needed for Anxiety  Notes to patient: Anxiety      sertraline 50 MG tablet  Commonly known as: ZOLOFT  Take 1 tablet by mouth daily  Notes to patient: Mood             CONTINUE taking these medications      ARIPiprazole 5 MG tablet  Commonly known as: ABILIFY  Take 1 tablet by mouth at bedtime  Notes to patient: Mood      lisinopril 5 MG tablet  Commonly known as: PRINIVIL;ZESTRIL  Take 1 tablet by mouth daily  Notes to patient: Blood pressure      pantoprazole 40 MG tablet  Commonly known as: PROTONIX  Take 1 tablet by mouth every morning (before breakfast)  Notes to patient: Acid reducer      Testosterone Cypionate 200 MG/ML Soln  Notes to patient: Low testosterone      therapeutic multivitamin-minerals tablet  Notes to patient: Multivitamin      traZODone 50 MG tablet  Commonly known as: DESYREL  Take 1 tablet by mouth nightly as needed for Sleep  Notes to patient: Sleep      Vyvanse 50 MG capsule  Generic drug: lisdexamfetamine  Notes to patient: Concentration aid             STOP taking these medications      amphetamine-dextroamphetamine 10 MG tablet  Commonly known as: ADDERALL     amphetamine-dextroamphetamine 20 MG tablet  Commonly known as: ADDERALL               Where to Get Your Medications        These medications were sent to [facility]  [address] [place] [address]      Phone: [phone]   ARIPiprazole 5 MG tablet  hydrOXYzine HCl 50 MG tablet  sertraline 50 MG tablet  traZODone 50 MG tablet     Pharmacy Instructions:     Die[facility]

## 2023-06-20 NOTE — GROUP NOTE
Psych-Ed/Relapse Prevention Group Note        Date: June 20, 2023 Start Time:  10:30am   End Time:  11:15am      Number of Participants in Group & Unit Census:  10/23    Topic: Stress Management and Leisure skills    Goal of Group:Patient will identify benefits of leisure for coping and stress management. Patient will participate in collaborative group activity and demonstrate improved interpersonal skills. Comments:     Patient did not participate in Psych-Ed/Relapse Prevention group, despite staff encouragement and explanation of benefits. Patient remain seclusive to self. Q15 minute safety checks maintained for patient safety and will continue to encourage patient to attend unit programming.          Signature:  Viv Lucero, [address]

## 2023-06-20 NOTE — BH NOTE
Patient given tobacco quitline number 62963750610 at this time, refusing to call at this time, states \" I just dont want to quit now\"- patient given information as to the dangers of long term tobacco use. Continue to reinforce the importance of tobacco cessation.

## 2023-06-20 NOTE — PLAN OF CARE
Problem: Pain  Goal: Verbalizes/displays adequate comfort level or baseline comfort level  6/19/2023 2121 by Torsten Hubbard RN  Outcome: Progressing  Note: Patient denies any pain currently. Patient encouraged to inform staff if he develops any pain. Patient voiced understanding. Q 15 minute checks done on pt for safety      Problem: Self Harm/Suicidality  Goal: Will have no self-injury during hospital stay  Description: INTERVENTIONS:  1. Ensure constant observer at bedside with Q15M safety checks  2. Maintain a safe environment  3. Secure patient belongings  4. Ensure family/visitors adhere to safety recommendations  5. Ensure safety tray has been added to patient's diet order  6. Every shift and PRN: Re-assess suicidal risk via Frequent Screener    6/19/2023 2121 by Torsten Hubbard RN  Outcome: Progressing  Note: Patient denies any thoughts to harm self and no signs of self harm were noted. Patient encouraged to inform staff if he starts to develop any thoughts to harm self. Patient voiced understanding. Q 15 minute checks done on pt for safety      Problem: Depression  Goal: Will be euthymic at discharge  Description: INTERVENTIONS:  1. Administer medication as ordered  2. Provide emotional support via 1:1 interaction with staff  3. Encourage involvement in milieu/groups/activities  4. Monitor for social isolation  6/19/2023 2121 by Torsten Hubbard RN  Outcome: Progressing  Note: Patient denies any depression currently. Patient reports some helplessness due to not being able to be discharged today as previously planned. Patient denies any suicidal ideations. Patient encouraged to attend groups to learn coping skills. Patient was focused on discharge and stated he was just waiting for tomorrow now. Q 15 minute checks done on pt for safety      Problem: Anxiety  Goal: Will report anxiety at manageable levels  Description: INTERVENTIONS:  1. Administer medication as ordered  2.  Teach and rehearse

## 2023-07-26 NOTE — DISCHARGE INSTR - DIET

## 2024-02-16 ENCOUNTER — HOSPITAL ENCOUNTER (INPATIENT)
Age: 34
LOS: 3 days | Discharge: HOME | DRG: 751 | End: 2024-02-19
Attending: PSYCHIATRY & NEUROLOGY | Admitting: PSYCHIATRY & NEUROLOGY
Payer: MEDICAID

## 2024-02-16 PROCEDURE — 99222 1ST HOSP IP/OBS MODERATE 55: CPT | Performed by: PSYCHIATRY & NEUROLOGY

## 2024-02-16 PROCEDURE — 1240000000 HC EMOTIONAL WELLNESS R&B

## 2024-02-16 PROCEDURE — 6370000000 HC RX 637 (ALT 250 FOR IP): Performed by: PSYCHIATRY & NEUROLOGY

## 2024-02-16 RX ORDER — POLYETHYLENE GLYCOL 3350 17 G
2 POWDER IN PACKET (EA) ORAL
Status: DISCONTINUED | OUTPATIENT
Start: 2024-02-16 | End: 2024-02-19 | Stop reason: HOSPADM

## 2024-02-16 RX ORDER — DIPHENHYDRAMINE HYDROCHLORIDE 50 MG/ML
50 INJECTION INTRAMUSCULAR; INTRAVENOUS EVERY 6 HOURS PRN
Status: DISCONTINUED | OUTPATIENT
Start: 2024-02-16 | End: 2024-02-19 | Stop reason: HOSPADM

## 2024-02-16 RX ORDER — PANTOPRAZOLE SODIUM 40 MG/1
40 TABLET, DELAYED RELEASE ORAL
Status: DISCONTINUED | OUTPATIENT
Start: 2024-02-17 | End: 2024-02-19 | Stop reason: HOSPADM

## 2024-02-16 RX ORDER — IBUPROFEN 400 MG/1
400 TABLET ORAL EVERY 6 HOURS PRN
Status: DISCONTINUED | OUTPATIENT
Start: 2024-02-16 | End: 2024-02-19 | Stop reason: HOSPADM

## 2024-02-16 RX ORDER — HYDROXYZINE 50 MG/1
50 TABLET, FILM COATED ORAL 3 TIMES DAILY PRN
Status: DISCONTINUED | OUTPATIENT
Start: 2024-02-16 | End: 2024-02-19 | Stop reason: HOSPADM

## 2024-02-16 RX ORDER — HALOPERIDOL 5 MG/ML
5 INJECTION INTRAMUSCULAR EVERY 6 HOURS PRN
Status: DISCONTINUED | OUTPATIENT
Start: 2024-02-16 | End: 2024-02-19 | Stop reason: HOSPADM

## 2024-02-16 RX ORDER — ACETAMINOPHEN 325 MG/1
650 TABLET ORAL EVERY 6 HOURS PRN
Status: DISCONTINUED | OUTPATIENT
Start: 2024-02-16 | End: 2024-02-19 | Stop reason: HOSPADM

## 2024-02-16 RX ORDER — HALOPERIDOL 5 MG/1
5 TABLET ORAL EVERY 6 HOURS PRN
Status: DISCONTINUED | OUTPATIENT
Start: 2024-02-16 | End: 2024-02-19 | Stop reason: HOSPADM

## 2024-02-16 RX ORDER — TRAZODONE HYDROCHLORIDE 50 MG/1
50 TABLET ORAL NIGHTLY PRN
Status: DISCONTINUED | OUTPATIENT
Start: 2024-02-16 | End: 2024-02-19 | Stop reason: HOSPADM

## 2024-02-16 RX ORDER — LISINOPRIL 5 MG/1
5 TABLET ORAL DAILY
Status: DISCONTINUED | OUTPATIENT
Start: 2024-02-16 | End: 2024-02-19 | Stop reason: HOSPADM

## 2024-02-16 RX ORDER — ESCITALOPRAM OXALATE 10 MG/1
5 TABLET ORAL DAILY
Status: DISCONTINUED | OUTPATIENT
Start: 2024-02-16 | End: 2024-02-19 | Stop reason: HOSPADM

## 2024-02-16 RX ORDER — M-VIT,TX,IRON,MINS/CALC/FOLIC 27MG-0.4MG
1 TABLET ORAL DAILY
Status: DISCONTINUED | OUTPATIENT
Start: 2024-02-16 | End: 2024-02-19 | Stop reason: HOSPADM

## 2024-02-16 RX ORDER — MAGNESIUM HYDROXIDE/ALUMINUM HYDROXICE/SIMETHICONE 120; 1200; 1200 MG/30ML; MG/30ML; MG/30ML
30 SUSPENSION ORAL EVERY 6 HOURS PRN
Status: DISCONTINUED | OUTPATIENT
Start: 2024-02-16 | End: 2024-02-19 | Stop reason: HOSPADM

## 2024-02-16 RX ORDER — POLYETHYLENE GLYCOL 3350 17 G/17G
17 POWDER, FOR SOLUTION ORAL DAILY PRN
Status: DISCONTINUED | OUTPATIENT
Start: 2024-02-16 | End: 2024-02-19 | Stop reason: HOSPADM

## 2024-02-16 RX ADMIN — HYDROXYZINE HYDROCHLORIDE 50 MG: 50 TABLET, FILM COATED ORAL at 03:01

## 2024-02-16 RX ADMIN — IBUPROFEN 400 MG: 400 TABLET, FILM COATED ORAL at 03:01

## 2024-02-16 ASSESSMENT — PATIENT HEALTH QUESTIONNAIRE - PHQ9
6. FEELING BAD ABOUT YOURSELF - OR THAT YOU ARE A FAILURE OR HAVE LET YOURSELF OR YOUR FAMILY DOWN: 2
9. THOUGHTS THAT YOU WOULD BE BETTER OFF DEAD, OR OF HURTING YOURSELF: 2
5. POOR APPETITE OR OVEREATING: 2
8. MOVING OR SPEAKING SO SLOWLY THAT OTHER PEOPLE COULD HAVE NOTICED. OR THE OPPOSITE, BEING SO FIGETY OR RESTLESS THAT YOU HAVE BEEN MOVING AROUND A LOT MORE THAN USUAL: 0
SUM OF ALL RESPONSES TO PHQ QUESTIONS 1-9: 14
SUM OF ALL RESPONSES TO PHQ QUESTIONS 1-9: 16
3. TROUBLE FALLING OR STAYING ASLEEP: 3
4. FEELING TIRED OR HAVING LITTLE ENERGY: 2
SUM OF ALL RESPONSES TO PHQ QUESTIONS 1-9: 16
1. LITTLE INTEREST OR PLEASURE IN DOING THINGS: 2
SUM OF ALL RESPONSES TO PHQ QUESTIONS 1-9: 16
2. FEELING DOWN, DEPRESSED OR HOPELESS: 2
7. TROUBLE CONCENTRATING ON THINGS, SUCH AS READING THE NEWSPAPER OR WATCHING TELEVISION: 1
10. IF YOU CHECKED OFF ANY PROBLEMS, HOW DIFFICULT HAVE THESE PROBLEMS MADE IT FOR YOU TO DO YOUR WORK, TAKE CARE OF THINGS AT HOME, OR GET ALONG WITH OTHER PEOPLE: 3
SUM OF ALL RESPONSES TO PHQ9 QUESTIONS 1 & 2: 4

## 2024-02-16 ASSESSMENT — SLEEP AND FATIGUE QUESTIONNAIRES
AVERAGE NUMBER OF SLEEP HOURS: 4
DO YOU USE A SLEEP AID: NO
DO YOU HAVE DIFFICULTY SLEEPING: YES
SLEEP PATTERN: DIFFICULTY FALLING ASLEEP;DISTURBED/INTERRUPTED SLEEP;RESTLESSNESS

## 2024-02-16 ASSESSMENT — PAIN SCALES - GENERAL
PAINLEVEL_OUTOF10: 3
PAINLEVEL_OUTOF10: 3
PAINLEVEL_OUTOF10: 0

## 2024-02-16 ASSESSMENT — PAIN DESCRIPTION - LOCATION
LOCATION: FOOT;LEG
LOCATION: LEG;FOOT

## 2024-02-16 ASSESSMENT — PAIN DESCRIPTION - DESCRIPTORS
DESCRIPTORS: ACHING;SORE
DESCRIPTORS: ACHING;SORE

## 2024-02-16 ASSESSMENT — LIFESTYLE VARIABLES
HOW OFTEN DO YOU HAVE A DRINK CONTAINING ALCOHOL: MONTHLY OR LESS
HOW MANY STANDARD DRINKS CONTAINING ALCOHOL DO YOU HAVE ON A TYPICAL DAY: 1 OR 2

## 2024-02-16 ASSESSMENT — PAIN - FUNCTIONAL ASSESSMENT: PAIN_FUNCTIONAL_ASSESSMENT: ACTIVITIES ARE NOT PREVENTED

## 2024-02-16 ASSESSMENT — PAIN DESCRIPTION - ORIENTATION
ORIENTATION: RIGHT;LEFT
ORIENTATION: RIGHT;LEFT

## 2024-02-16 NOTE — PROGRESS NOTES

## 2024-02-16 NOTE — PROGRESS NOTES
Behavioral Services  Medicare Certification Upon Admission    I certify that this patient's inpatient psychiatric hospital admission is medically necessary for:    [x] (1) Treatment which could reasonably be expected to improve this patient's condition,       [x] (2) Or for diagnostic study;     AND     [x](2) The inpatient psychiatric services are provided while the individual is under the care of a physician and are included in the individualized plan of care.    Estimated length of stay/service 3 to 5 days    Plan for post-hospital care home with outpatient community mental health follow-up    Electronically signed by SHIMA HOANG MD on 2/16/2024 at 3:21 PM

## 2024-02-16 NOTE — BH NOTE
[facility]. contacted to verify medications. [phone]  Abilify 5mg  Tadalasil 5mg  Protonix 40mg  Dextroamp-amphet ER 30mg  Testosterone cypionate 200mg/ml

## 2024-02-16 NOTE — BH NOTE
[facility]  Admission Note     Admission Type:   Involuntary - Signed in Upon Admission    Reason for admission:  Reason for Admission: Patient reports worsening suicidal ideation for the past 3 days with a plan to walk into traffic. Pt reports feeling hopeless and helpless due to recent loss of employment and housing.      Addictive Behavior:   Addictive Behavior  In the Past 3 Months, Have You Felt or Has Someone Told You That You Have a Problem With  : None    Medical Problems:   Past Medical History:   Diagnosis Date    ADD (attention deficit disorder)     not sure of diag, was on adderall in college, then approx 6 mos util 3/2017    Cocaine abuse (HCC)     Concussion     Depressive disorder     was put on med at time hospitalization, [facility] psych, took celexa x sev wks only.     Murmur        Status EXAM:  Mental Status and Behavioral Exam  Normal: No  Level of Assistance: Independent/Self  Facial Expression: Flat, Sad, Worried  Affect: Congruent  Level of Consciousness: Alert  Frequency of Checks: 4 times per hour, close  Mood:Normal: No  Mood: Depressed, Anxious, Helpless, Irritable, Sad  Motor Activity:Normal: Yes  Eye Contact: Fair  Observed Behavior: Cooperative, Preoccupied  Sexual Misconduct History: Current - no  Preception: [place] to person, [place] to time, [place] to place, [place] to situation  Attention:Normal: No  Attention: Distractible  Thought Processes: Circumstantial  Thought Content:Normal: No  Thought Content: Preoccupations  Depression Symptoms: Impaired concentration, Feelings of helplessness, Feelings of hopelessess, Sleep disturbance  Anxiety Symptoms: Generalized  Teri Symptoms: No problems reported or observed.  Hallucinations: None  Delusions: No  Memory:Normal: Yes  Insight and Judgment: No  Insight and Judgment: Poor judgment    Tobacco Screening:  Practical Counseling, on admission, juan jose X, if applicable and completed (first 3 are required if patient doesn't refuse):

## 2024-02-16 NOTE — GROUP NOTE
Psych-Ed/Relapse Prevention Group Note        Date: February 16, 2024 Start Time:  10:30   End Time:  11:15      Number of Participants in Group & Unit Census:  12/23    Topic: Socialization skills and peer support    Goal of Group:Patient will demonstrate improved interpersonal skills and offer peer support.        Comments:     Patient did not participate in Psych-Ed/Relapse Prevention group, despite staff encouragement and explanation of benefits.  Patient remain seclusive to self.  Q15 minute safety checks maintained for patient safety and will continue to encourage patient to attend unit programming.         Signature:  Gaby Jose, CTRS

## 2024-02-16 NOTE — GROUP NOTE
Psych-Ed/Relapse Prevention and Recreation Group Note        Date: February 16, 2024 Start Time: 1:30pm  End Time:  2:15pm      Number of Participants in Group & Unit Census:  6/18    Topic: Leisure skills    Goal of Group:Patient will identify benefits of leisure for coping and stress management      Comments:     Patient did not participate in Psych-Ed/Relapse Prevention and Recreation group, despite staff encouragement and explanation of benefits.  Patient remain seclusive to self.  Q15 minute safety checks maintained for patient safety and will continue to encourage patient to attend unit programming.         Signature:  Gaby Jose CTRS

## 2024-02-16 NOTE — CARE COORDINATION
BHI Biopsychosocial Assessment    Current Level of Psychosocial Functioning     Independent xxx  Dependent    Minimal Assist     Comments:    Psychosocial High Risk Factors (check all that apply)    Unable to obtain meds   Chronic illness/pain    Substance abuse xx  Lack of Family Support  xx  Financial stress   Isolation   Inadequate Community Resources  Suicide attempt(s)  Not taking medications xx  Victim of crime   Developmental Delay  Unable to manage personal needs    Age 65 or older   Homeless  No transportation   Readmission within 30 days  Unemployment  Traumatic Event    Comments:   Psychiatric Advanced Directives: none reported     Family to Involve in Treatment: pt estranged from family     Sexual Orientation:  n/a    Patient Strengths: pt denies current substance use (positive Utox upon admission), linked with [facility]     Patient Barriers: pt is currently homeless, recently fired from his job, estranged from family,       Opiate Education Provided:  pt denies opiate abuse       CMHC/mental health history: pt is not linked with outpatient provider     Plan of Care   medication management, group/individual therapies, family meetings, psycho -education, treatment team meetings to assist with stabilization    Initial Discharge Plan:  pt given AOD resource folder, exploring options       Clinical Summary:  Calixto is a 34 year old single male who has been admitted to [facility] with report of suicidal ideation with plan to walk into traffic per chart, pt denies having plan when asked during assessment. 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 Pt reports he is currently homeless, is not linked with outpatient provider. Pt was last hospitalized at [facility] 6/20/2023, states he is currently linked with [facility]. SW offered ongoing support, encouragement.   SW offered ongoing support and

## 2024-02-16 NOTE — BH NOTE
Writer went to give patient his medications that were presribed by the doctor. Patient was scheduled lisinopril, Lexapro, and Multivitamin. Patient stated \"I don't feel like I was heard by the doctor. So I am not going to take anything until I talk to him again.\" Writer attempted to educate patient on importance of taking medications but patient was irritable and dismissive of writer.

## 2024-02-17 PROCEDURE — 99232 SBSQ HOSP IP/OBS MODERATE 35: CPT

## 2024-02-17 PROCEDURE — 6370000000 HC RX 637 (ALT 250 FOR IP): Performed by: PSYCHIATRY & NEUROLOGY

## 2024-02-17 PROCEDURE — 1240000000 HC EMOTIONAL WELLNESS R&B

## 2024-02-17 PROCEDURE — 99222 1ST HOSP IP/OBS MODERATE 55: CPT | Performed by: INTERNAL MEDICINE

## 2024-02-17 RX ADMIN — HYDROXYZINE HYDROCHLORIDE 50 MG: 50 TABLET, FILM COATED ORAL at 20:55

## 2024-02-17 RX ADMIN — PANTOPRAZOLE SODIUM 40 MG: 40 TABLET, DELAYED RELEASE ORAL at 06:20

## 2024-02-17 ASSESSMENT — LIFESTYLE VARIABLES
HOW MANY STANDARD DRINKS CONTAINING ALCOHOL DO YOU HAVE ON A TYPICAL DAY: 1 OR 2
HOW OFTEN DO YOU HAVE A DRINK CONTAINING ALCOHOL: MONTHLY OR LESS

## 2024-02-17 ASSESSMENT — PAIN SCALES - GENERAL: PAINLEVEL_OUTOF10: 0

## 2024-02-17 NOTE — H&P
Department of Psychiatry  Attending Physician Psychiatric Assessment     Reason for Admission to Psychiatric Unit:  Concerns about patient's safety in the community    CHIEF COMPLAINT:  Suicidal Ideation    History obtained from: Patient, electronic medical record          HISTORY OF PRESENT ILLNESS:    Calixto Dobbins is a 34 y.o. male who has a past medical history of depression with suicidal ideation, alcohol abuse and cocaine abuse. He also has hypogonadism on androgen replacement. Patient presented to the ED with concerns for his safety due to being acutely suicidal. He had a recent inpatient [facility] admission in June 2023.    When approached patient is calm and cooperative. He appears well groomed and is calm. He is in hospital attire with suicide precautions laying in bed. Patient speaks in a monotonous voice with a flat affect.     Patient states that he was recently laid off from his job 3 days ago and since then he has had suicidal ideation. He said that this was unexpected and taken as a shock which acutely sent him over the edge. When asked about where he was working or the circumstances of his termination he states \"I don't want to talk about it.\" He has been homeless since losing his job and has been sleeping out in the street. Of note he also ran out of his testosterone replacement medication and that this typically worsens his mood abruptly. He says that prior to this his mood was stable. His SI worsened over the last 3 days but he denies any specific intent or plan. He says \"this is the safest place for me\" and \"if I leave now I don't know what will happen.\"    Patient is experiencing a depressed mood, anhedonia, hopelessness and SI. He has been eating and sleeping less for the last few days which he attributes to beiing on the street. Patient denies visual or auditory hallucinations. He says that he has been told that he was bipolar in the past with one brief episode of decreased sleep and higher 
cocaine, currently denies any chest pain shortness of breath next  Recently TSH was checked which was normal  Labs reviewed patient's polycythemia, hemoglobin of 18.4, on testosterone which is likely the cause  History of hypogonadism, on testosterone        Cynthia Wolff MD  2/17/2024  2:51 PM    Copy sent to Dr. Trivedi, None    Please note that this chart was generated using voice recognition Dragon dictation software.  Although every effort was made to ensure the accuracy of this automated transcription, some errors in transcription may have occurred.

## 2024-02-17 NOTE — GROUP NOTE
Group Therapy Note    Date: 2/17/2024    Group Start Time: 0900  Group End Time: 0915  Group Topic: Community Meeting    [facility] Heike Brunson RN        Group Therapy Note    Attendees: 5/21       Patient's Goal: Refused to attend      Notes:      Status After Intervention:      Participation Level:     Participation Quality:       Speech:        Thought Process/Content:       Affective Functioning:       Mood:       Level of consciousness:        Response to Learning:       Endings:     Modes of Intervention:       Discipline Responsible:       Signature:  Heike Ordoñez RN

## 2024-02-17 NOTE — PROGRESS NOTES
Daily Progress Note  2/17/2024    Patient Name: Calixto Dobbins    CHIEF COMPLAINT: Suicidal ideation         SUBJECTIVE:      Patient is seen today for a follow up assessment. When approached, patient accepted need for privacy.  Interview occurred privately in patients room.  Patient continues to refuse scheduled Lexapro.  Patient states he why would he take it if he is not allowed to take his other medication.  Patient continues to be extremely focused on stimulant medication.  It is previously documented high concern for patient medication seeking.  Patient went on to report going through withdrawals from ADHD medication and endorsing \"cold sweats\".  When discussing substance abuse patient made a point to stay cocaine use was \"a very long time ago\".  Patient went on to report he is annoyed everyone keeps asking about this and states we are playing a game with him.  Patient continues to endorse significant depression and suicidal ideation and refuses to disclose plan.  Patient is unable to contract for safety outside the hospital at this time.      Appetite:  [x] Adequate/Unchanged  [] Increased  [] Decreased      Sleep:       [x] Adequate/Unchanged  [] Fair  [] Poor      Group Attendance on Unit:   [] Yes   [] Selectively    [x] No    Compliant with scheduled medications: [] Yes  [x] No    Received emergency medications in past 24 hrs: [] Yes   [x] No    Medication Side Effects: Denies         Mental Status Exam  Level of consciousness: Alert and awake.   Appearance: Appropriate attire for setting, seated in chair, with fair  grooming and hygiene.   Behavior/Motor: Approachable, superficially compliant with interview  Attitude toward examiner: Fair cooperation, attentive, good eye contact.  Speech: normal rate and normal volume   Mood:  Patient reports \" irritated\".   Affect: Reactive  Thought processes: Linear, goal directed, and coherent.   Thought content: Active suicidal ideations, with a  current plan,

## 2024-02-17 NOTE — GROUP NOTE
Group Note    2/17/24           Start time: 1000      Number of participants in Group & unit census: 8    Topic:coping skills     Goal of Group:education    Comments:  Patient did not participate in coping skills group, despite staff encouragement and explanation of benefits. Patient remains seclusive to self. Every 15 minute safety checks maintained for patient safety and will continue to encourage patient to attend unit programming.

## 2024-02-17 NOTE — GROUP NOTE
Group Therapy Note    Date: 2/17/2024    Group Start Time: 1400  Group End Time: 1500  Group Topic: Cognitive Skills    Kandace Hughes CTRS        Group Therapy Note    Attendees: 6/19     Topic: To increase social interaction and to practice decision making,   and communication skills.        Patient did not participate in Cognitive Skills Group at 14:00, despite staff encouragement   and explanation of benefits. Patient remains seclusive to self , and room.      Q15 minute safety checks maintained for patient safety and will continue to encourage   patient to attend unit programming.         Discipline Responsible: Psychoeducational Specialist    Signature:  RADHA ARGUELLES

## 2024-02-18 PROCEDURE — 6370000000 HC RX 637 (ALT 250 FOR IP): Performed by: PSYCHIATRY & NEUROLOGY

## 2024-02-18 PROCEDURE — 1240000000 HC EMOTIONAL WELLNESS R&B

## 2024-02-18 PROCEDURE — 99232 SBSQ HOSP IP/OBS MODERATE 35: CPT | Performed by: INTERNAL MEDICINE

## 2024-02-18 PROCEDURE — 99232 SBSQ HOSP IP/OBS MODERATE 35: CPT | Performed by: NURSE PRACTITIONER

## 2024-02-18 RX ADMIN — PANTOPRAZOLE SODIUM 40 MG: 40 TABLET, DELAYED RELEASE ORAL at 06:39

## 2024-02-18 RX ADMIN — HYDROXYZINE HYDROCHLORIDE 50 MG: 50 TABLET, FILM COATED ORAL at 20:40

## 2024-02-18 ASSESSMENT — PAIN SCALES - GENERAL: PAINLEVEL_OUTOF10: 0

## 2024-02-18 NOTE — GROUP NOTE
Group Therapy Note    Date: 2/18/2024    Group Start Time: 1700  Group End Time: 1715  Group Topic: Group Documentation    Henna Fierro    Patient participated in Sunday Safety Checks.  No incident to document.  Patient is comfortable approaching staff for any needs or requests. Patient agrees to notify staff if any thoughts, feelings, or concerns need to be brought to their attention. Q15 minute safety checks maintained.

## 2024-02-18 NOTE — GROUP NOTE
Psych-Ed/Relapse Prevention Group Note        Date: February 18, 2024 Start Time: 11am  End Time: 11:45am      Number of Participants in Group & Unit Census:  10/21    Topic: Socialization     Goal of Group:Patient will demonstrate improved interpersonal skills      Comments:     Patient did not participate in Psych-Ed/Relapse Prevention group, despite staff encouragement and explanation of benefits.  Patient remain seclusive to self.  Q15 minute safety checks maintained for patient safety and will continue to encourage patient to attend unit programming.         Signature:  ALEX KwanS

## 2024-02-18 NOTE — PROGRESS NOTES
Daily Progress Note  2/18/2024    Patient Name: Calixto Dobbins    CHIEF COMPLAINT: Suicidal ideation         SUBJECTIVE:      Patient seen face-to-face for follow-up assessment.  He is lying in bed but is arousable to verbal stimuli. Patient is withdrawn and isolative. Grooming and hygiene remains poor and he is malodorous.  Patient has been noncompliant with treatment.  Refusing medications.  He is focused on receiving stimulants and states that he will refuse all medications until \"I get what I need\".  Patient states that he did present to [facility] voluntarily and expresses significant depression.  He however, displays little insight into taking medications to help with mood or being more active in attending group programming or being engaged in the milieu.  Patient currently rating depression as 8 out of 10 and anxiety as 7 out of 10 (both on a scale from 0-10, with 10 indicating the most severe).  Patient is evasive in describing any continued suicidal thoughts.  He however, is not able to contract for safety of unit.  He is in different times dismissive of discussion.  Requested for writer to leave room unless agreeable to start stimulant.  Interview terminated at this time.    Patient is yet to demonstrate stability.  Requires inpatient hospitalization for safety.    Appetite:  [x] Adequate/Unchanged  [] Increased  [] Decreased      Sleep:       [] Adequate/Unchanged  [x] Fair  [] Poor      Group Attendance on Unit:   [] Yes   [] Selectively    [x] No    Compliant with scheduled medications: [] Yes  [x] No    Received emergency medications in past 24 hrs: [] Yes   [x] No    Medication Side Effects: Denies         Mental Status Exam  Level of consciousness: Alert and awake.   Appearance: Appropriate attire for setting, resting in bed, with poor grooming and hygiene.   Behavior/Motor: Approachable, little energy or motivation  Attitude toward examiner: Poor cooperation, dismissive, no eye contact  Speech:

## 2024-02-18 NOTE — PROGRESS NOTES
IN-PATIENT SERVICE  [facility] Internal Medicine    CONSULTATION / HISTORY AND PHYSICAL EXAMINATION            Date:   2/18/2024  Patient name:  Calixto Dobbins  Date of admission:  2/16/2024  2:20 AM  MRN:   [id number]  Account:  [id number]  YOB: 1990  PCP:    None, None  Room:   [address] [place] [address]  Code Status:    Full Code    Physician Requesting Consult: Diego Roberts MD    Reason for Consult:  medical management    Chief Complaint:     No chief complaint on file.      History Obtained From:     Patient medical record nursing staff    History of Present Illness:     Patient is 34-year-old male with past medical history of depression, hypertension history of alcohol abuse and cocaine abuse is been admitted at [facility] for further management of depression and suicidal ideation.    Past Medical History:     Past Medical History:   Diagnosis Date    ADD (attention deficit disorder)     not sure of diag, was on adderall in college, then approx 6 mos util 3/2017    Cocaine abuse ([facility])     Concussion     Depressive disorder     was put on med at time hospitalization, [facility] psych, took celexa x sev wks only.     Murmur         Past Surgical History:     No past surgical history on file.     Medications Prior to Admission:     Prior to Admission medications    Medication Sig Start Date End Date Taking? Authorizing Provider   ARIPiprazole (ABILIFY) 5 MG tablet Take 1 tablet by mouth at bedtime 6/19/23   Johanna Amos APRN - CNP   traZODone (DESYREL) 50 MG tablet Take 1 tablet by mouth nightly as needed for Sleep 6/19/23   Johanna Amos APRN - CNP   sertraline (ZOLOFT) 50 MG tablet Take 1 tablet by mouth daily 6/20/23   Johanna Amos APRN - LIZ   lisdexamfetamine (VYVANSE) 50 MG capsule Take 1 capsule by mouth every morning. Max Daily Amount: 50 mg    ProviderBriseyda MD   lisinopril (PRINIVIL;ZESTRIL) 5 MG tablet Take 1 tablet by mouth daily 3/28/23   Heaven Lazaro APRN

## 2024-02-18 NOTE — GROUP NOTE
Group Therapy Note    Date: 2/18/2024    Group Start Time: 0900  Group End Time: 0915  Group Topic: Orientation Group    Henna Fierro; Dasia Gao LPN        Group Therapy Note    Attendees: 9/21    Community Meeting Group Note        Date: 2/18/2024  Start Time: 0900 End Time: 0915      Number of Participants in Group & Unit Census:  9/21    Topic: morning orientation group    Goal of Group: patients have opportunity to learn about the unit and ask questions. Patient states a short-term goal to accomplish today      Comments:     Patient did not participate in Community Meeting group, despite staff encouragement and explanation of benefits.  Patient remain seclusive to self.  Q15 minute safety checks maintained for patient safety and will continue to encourage patient to attend unit programming.

## 2024-02-19 VITALS
DIASTOLIC BLOOD PRESSURE: 70 MMHG | RESPIRATION RATE: 14 BRPM | WEIGHT: 257.5 LBS | OXYGEN SATURATION: 96 % | TEMPERATURE: 97.6 F | BODY MASS INDEX: 34.13 KG/M2 | HEIGHT: 73 IN | SYSTOLIC BLOOD PRESSURE: 126 MMHG | HEART RATE: 72 BPM

## 2024-02-19 PROCEDURE — 99239 HOSP IP/OBS DSCHRG MGMT >30: CPT | Performed by: PSYCHIATRY & NEUROLOGY

## 2024-02-19 PROCEDURE — 6370000000 HC RX 637 (ALT 250 FOR IP): Performed by: PSYCHIATRY & NEUROLOGY

## 2024-02-19 RX ADMIN — PANTOPRAZOLE SODIUM 40 MG: 40 TABLET, DELAYED RELEASE ORAL at 05:52

## 2024-02-19 NOTE — BH NOTE
Patient given tobacco quitline number 77574627233 at this time, refusing to call at this time, states \" I just dont want to quit now\"- patient given information as to the dangers of long term tobacco use. Continue to reinforce the importance of tobacco cessation.

## 2024-02-19 NOTE — DISCHARGE SUMMARY
about it.\" He has been homeless since losing his job and has been sleeping out in the street. Of note he also ran out of his testosterone replacement medication and that this typically worsens his mood abruptly. He says that prior to this his mood was stable. His SI worsened over the last 3 days but he denies any specific intent or plan. He says \"this is the safest place for me\" and \"if I leave now I don't know what will happen.\"     Patient is experiencing a depressed mood, anhedonia, hopelessness and SI. He has been eating and sleeping less for the last few days which he attributes to beiing on the street. Patient denies visual or auditory hallucinations. He says that he has been told that he was bipolar in the past with one brief episode of decreased sleep and higher than normal energy level for about 3 days. Denies inflated self esteem, racing thoughts, elated mood, impulsivity during this.      He is taking abilify 5mg nightly, zoloft 50mg daily, vyvanse 50mg daily and tazodone as needed for sleep. He feels that his medication helps him and claims that this was a result of circumstance rather than a medication issue.    Hospital Course:   Upon admission, Calixto Dobbins was provided a safe secure environment, introduced to unit milieu. Patient participated in groups and individual therapies.  Med recommendations were made.  Patient was noncompliant with medication and processed to not getting his stimulant medication.  Discussed at length that he had a stimulant use disorder and patient is a licensed counselor for stimulant use disorders.  Patient refusing to go to any treatment plan.  Flat out told this author that he did not want his  finding out that he had used cocaine.  Patient was noncompliant with medication recommendations and stated he had no interest in taking \"your medications\".  He was denying suicidal or homicidal ideation intent or plan.  It was felt maximum benefit from

## 2024-02-19 NOTE — PLAN OF CARE
Problem: Coping  Goal: Pt/Family able to verbalize concerns and demonstrate effective coping strategies  Description: INTERVENTIONS:  1. Assist patient/family to identify coping skills, available support systems and cultural and spiritual values  2. Provide emotional support, including active listening and acknowledgement of concerns of patient and caregivers  3. Reduce environmental stimuli, as able  4. Instruct patient/family in relaxation techniques, as appropriate  5. Assess for spiritual pain/suffering and initiate Spiritual Care, Psychosocial Clinical Specialist consults as needed  2/16/2024 2043 by Zay Arshad  Outcome: Progressing     Problem: Depression/Self Harm  Goal: Effect of psychiatric condition will be minimized and patient will be protected from self harm  Description: INTERVENTIONS:  1. Assess impact of patient's symptoms on level of functioning, self care needs and offer support as indicated  2. Assess patient/family knowledge of depression, impact on illness and need for teaching  3. Provide emotional support, presence and reassurance  4. Assess for possible suicidal thoughts or ideation. If patient expresses suicidal thoughts or statements do not leave alone, initiate Suicide Precautions, move to a room close to the nursing station and obtain sitter  5. Initiate consults as appropriate with Mental Health Professional, Spiritual Care, Psychosocial CNS, and consider a recommendation to the LIP for a Psychiatric Consultation  2/16/2024 2043 by Zay Arshad  Outcome: Progressing     Patient is laying in bed at this time. Patient is cooperative and guarded. Patient reports anxiety and depression at this time. Patient states anxiety is a \"6\" on a scale of 0-10. Patient encouraged to explore coping skills for reducing anxiety. Patient denies auditory and visual hallucinations at this time. Patient reports eating and sleeping. Patient reports he has not gone to groups. Patient remains free from 
  Problem: Coping  Goal: Pt/Family able to verbalize concerns and demonstrate effective coping strategies  Description: INTERVENTIONS:  1. Assist patient/family to identify coping skills, available support systems and cultural and spiritual values  2. Provide emotional support, including active listening and acknowledgement of concerns of patient and caregivers  3. Reduce environmental stimuli, as able  4. Instruct patient/family in relaxation techniques, as appropriate  5. Assess for spiritual pain/suffering and initiate Spiritual Care, Psychosocial Clinical Specialist consults as needed  2/17/2024 0910 by Sugar Foy, RN  Outcome: Progressing  Note: Mr. Dobbins refuses his medications this morning. Patient gives brief answers, eye contact is poor and patient remains isolative throughout the shift. Patient encouraged to attend groups, educated on medications and medication compliance.   2/16/2024 2043 by Zay Arshad  Outcome: Progressing     Problem: Depression/Self Harm  Goal: Effect of psychiatric condition will be minimized and patient will be protected from self harm  Description: INTERVENTIONS:  1. Assess impact of patient's symptoms on level of functioning, self care needs and offer support as indicated  2. Assess patient/family knowledge of depression, impact on illness and need for teaching  3. Provide emotional support, presence and reassurance  4. Assess for possible suicidal thoughts or ideation. If patient expresses suicidal thoughts or statements do not leave alone, initiate Suicide Precautions, move to a room close to the nursing station and obtain sitter  5. Initiate consults as appropriate with Mental Health Professional, Spiritual Care, Psychosocial CNS, and consider a recommendation to the LIP for a Psychiatric Consultation  2/17/2024 0910 by Sugar Foy, RN  Outcome: Progressing  Note: Mr. Dobbins denies thoughts of self harm at time of assessment. Patient minimizes suicidal 
  Problem: Coping  Goal: Pt/Family able to verbalize concerns and demonstrate effective coping strategies  Description: INTERVENTIONS:  1. Assist patient/family to identify coping skills, available support systems and cultural and spiritual values  2. Provide emotional support, including active listening and acknowledgement of concerns of patient and caregivers  3. Reduce environmental stimuli, as able  4. Instruct patient/family in relaxation techniques, as appropriate  5. Assess for spiritual pain/suffering and initiate Spiritual Care, Psychosocial Clinical Specialist consults as needed  Outcome: Progressing     Problem: Depression/Self Harm  Goal: Effect of psychiatric condition will be minimized and patient will be protected from self harm  Description: INTERVENTIONS:  1. Assess impact of patient's symptoms on level of functioning, self care needs and offer support as indicated  2. Assess patient/family knowledge of depression, impact on illness and need for teaching  3. Provide emotional support, presence and reassurance  4. Assess for possible suicidal thoughts or ideation. If patient expresses suicidal thoughts or statements do not leave alone, initiate Suicide Precautions, move to a room close to the nursing station and obtain sitter  5. Initiate consults as appropriate with Mental Health Professional, Spiritual Care, Psychosocial CNS, and consider a recommendation to the LIP for a Psychiatric Consultation  Outcome: Progressing     Problem: Risk for Elopement  Goal: Patient will not exit the unit/facility without proper excort  Outcome: Progressing     Problem: Pain  Goal: Verbalizes/displays adequate comfort level or baseline comfort level  Outcome: Progressing     Problem: Behavior  Goal: Pt/Family maintain appropriate behavior and adhere to behavioral management agreement, if implemented  Description: INTERVENTIONS:  1. Assess patient/family's coping skills and  non-compliant behavior (including use of 
  Problem: Depression/Self Harm  Goal: Effect of psychiatric condition will be minimized and patient will be protected from self harm  Description: INTERVENTIONS:  1. Assess impact of patient's symptoms on level of functioning, self care needs and offer support as indicated  2. Assess patient/family knowledge of depression, impact on illness and need for teaching  3. Provide emotional support, presence and reassurance  4. Assess for possible suicidal thoughts or ideation. If patient expresses suicidal thoughts or statements do not leave alone, initiate Suicide Precautions, move to a room close to the nursing station and obtain sitter  5. Initiate consults as appropriate with Mental Health Professional, Spiritual Care, Psychosocial CNS, and consider a recommendation to the LIP for a Psychiatric Consultation  2/17/2024 2030 by Christel Cummings LPN  Note: Patient denies thoughts of self harm at this time. Patient agrees to seek out staff if they begin having thoughts of harming self or they need to talk. Q15min safety checks continue     Problem: Anxiety  Goal: Will report anxiety at manageable levels  Description: INTERVENTIONS:  1. Administer medication as ordered  2. Teach and rehearse alternative coping skills  3. Provide emotional support with 1:1 interaction with staff  2/17/2024 2030 by Christel Cummings LPN  Note: Patient denies suicidal thoughts and hallucinations. He reports moderate depression and anxiety, that he stated he talked with the doctor about. He has been out in the milieu at times and calm. Q15min safety checks continue     
  Problem: Depression/Self Harm  Goal: Effect of psychiatric condition will be minimized and patient will be protected from self harm  Description: INTERVENTIONS:  1. Assess impact of patient's symptoms on level of functioning, self care needs and offer support as indicated  2. Assess patient/family knowledge of depression, impact on illness and need for teaching  3. Provide emotional support, presence and reassurance  4. Assess for possible suicidal thoughts or ideation. If patient expresses suicidal thoughts or statements do not leave alone, initiate Suicide Precautions, move to a room close to the nursing station and obtain sitter  5. Initiate consults as appropriate with Mental Health Professional, Spiritual Care, Psychosocial CNS, and consider a recommendation to the LIP for a Psychiatric Consultation  2/18/2024 2016 by Christle Cummings LPN  Note: Patient denies thoughts of self harm at this time. Patient agrees to seek out staff if they begin having thoughts of harming self or they need to talk. Q15min safety checks continue     Problem: Anxiety  Goal: Will report anxiety at manageable levels  Description: INTERVENTIONS:  1. Administer medication as ordered  2. Teach and rehearse alternative coping skills  3. Provide emotional support with 1:1 interaction with staff  2/18/2024 2016 by Christel Cummings LPN  Note: Patient denies suicidal thoughts and hallucinations. He reports depression and anxiety are slightly improved, he rates both 7/10. He has been mostly isolative and resting. Q15min safety checks continue     
[facility]  Day 3 Interdisciplinary Treatment Plan NOTE    Review Date & Time:  2/18/24 1250    Admission Type:   Admission Type: Involuntary    Reason for admission:  Reason for Admission: Patient reports worsening suicidal ideation for the past 3 days with a plan to walk into traffic. Pt reports feeling hopeless and helpless due to recent loss of employment and housing.  Estimated Length of Stay:  5-7 days  Estimated Discharge Date Update:  to be determined by physician    PATIENT STRENGTHS:  Patient Strengths:   Patient Strengths and Limitations:Limitations: Difficulty problem solving/relies on others to help solve problems, Multiple barriers to leisure interests, External locus of control, Tendency to isolate self  Addictive Behavior:Addictive Behavior  In the Past 3 Months, Have You Felt or Has Someone Told You That You Have a Problem With  : None  Medical Problems:  Past Medical History:   Diagnosis Date    ADD (attention deficit disorder)     not sure of diag, was on adderall in college, then approx 6 mos util 3/2017    Cocaine abuse (HCC)     Concussion     Depressive disorder     was put on med at time hospitalization, st  psych, took celexa x sev wks only.     Murmur        Risk:  Fall Risk   Pete Scale Pete Scale Score: 21  BVC    Change in scores:  No Changes to plan of Care:  No    Status EXAM:   Mental Status and Behavioral Exam  Normal: No  Level of Assistance: Independent/Self  Facial Expression: Flat  Affect: Blunt  Level of Consciousness: Alert  Frequency of Checks: 4 times per hour, close  Mood:Normal: No  Mood: Depressed, Anxious  Motor Activity:Normal: No  Motor Activity: Decreased  Eye Contact: Poor  Observed Behavior: Guarded  Sexual Misconduct History: Current - no  Preception: [place] to person, [place] to time, [place] to place, [place] to situation  Attention:Normal: No  Attention: Distractible  Thought Processes: Circumstantial  Thought Content:Normal: No  Thought 
[facility]  Initial Interdisciplinary Treatment Plan Note      Original treatment plan Date & Time: 2/16/24 1245    Admission Type:  Admission Type: Involuntary    Reason for admission:   Reason for Admission: Patient reports worsening suicidal ideation for the past 3 days with a plan to walk into traffic. Pt reports feeling hopeless and helpless due to recent loss of employment and housing.    Estimated Length of Stay:  5-7days  Estimated Discharge Date: To be determined by physician.    PATIENT STRENGTHS:  Patient Strengths:   Patient Strengths and Limitations:Limitations: Difficulty problem solving/relies on others to help solve problems, Multiple barriers to leisure interests, External locus of control, Tendency to isolate self  Addictive Behavior: Addictive Behavior  In the Past 3 Months, Have You Felt or Has Someone Told You That You Have a Problem With  : None  Medical Problems:  Past Medical History:   Diagnosis Date    ADD (attention deficit disorder)     not sure of diag, was on adderall in college, then approx 6 mos util 3/2017    Cocaine abuse (HCC)     Concussion     Depressive disorder     was put on med at time hospitalization, st  psych, took celexa x sev wks only.     Murmur      Status EXAM:Mental Status and Behavioral Exam  Normal: No  Level of Assistance: Independent/Self  Facial Expression: Flat, Expressionless  Affect: Congruent  Level of Consciousness: Alert  Frequency of Checks: 4 times per hour, close  Mood:Normal: No  Mood: Depressed, Anxious, Helpless  Motor Activity:Normal: Yes  Eye Contact: Poor  Observed Behavior: Cooperative, Guarded  Sexual Misconduct History: Current - no  Preception: [place] to person, [place] to time, [place] to place, [place] to situation  Attention:Normal: No  Attention: Distractible  Thought Processes: Circumstantial  Thought Content:Normal: No  Thought Content: Preoccupations  Depression Symptoms: Feelings of hopelessess, Feelings of helplessness, 
LPN  Note: Patient denies thoughts of self harm at this time. Patient agrees to seek out staff if they begin having thoughts of harming self or they need to talk. Q15min safety checks continue     Problem: Risk for Elopement  Goal: Patient will not exit the unit/facility without proper excort  Outcome: Progressing  Note: Mr. Dobbins is isolative to room, patient reports he is looking forward to speaking with the provider today. Patient is not displaying activities that pose elopement risk.      Problem: Pain  Goal: Verbalizes/displays adequate comfort level or baseline comfort level  Outcome: Progressing  Note: Mr. Dobbins denies pain at time of assessment. Patient educated on 0-10 pain scale and PRN pain medications.      Problem: Behavior  Goal: Pt/Family maintain appropriate behavior and adhere to behavioral management agreement, if implemented  Description: INTERVENTIONS:  1. Assess patient/family's coping skills and  non-compliant behavior (including use of illegal substances)  2. Notify security of behavior or suspected illegal substances which indicate the need for search of the family and/or belongings  3. Encourage verbalization of thoughts and concerns in a socially appropriate manner  4. Utilize positive, consistent limit setting strategies supporting safety of patient, staff and others  5. Encourage participation in the decision making process about the behavioral management agreement  6. If a visitor's behavior poses a threat to safety call refer to organization policy.  7. Initiate consult with , Psychosocial CNS, Spiritual Care as appropriate  Outcome: Progressing  Note: Mr. Dobbins remains in control of own behaviors. Patient is calm and cooperative with assessment.      Problem: Anxiety  Goal: Will report anxiety at manageable levels  Description: INTERVENTIONS:  1. Administer medication as ordered  2. Teach and rehearse alternative coping skills  3. Provide emotional support with

## 2024-02-19 NOTE — CARE COORDINATION
Crispin spoke with Suly olivier, scheduled 2/22/2024 at 830am at the [facility] location with Dr. Sweeney

## 2024-02-19 NOTE — GROUP NOTE
Group Therapy Note    Date: 2/18/2024    Group Start Time: 1945  Group End Time: 2000  Group Topic: Wrap-Up    Peace Singer        Group Therapy Note    Attendees: 10 out of 18 patient refused to attend wrap up group at 2000 after encouragement from staff.  1:1 talk time provided as alternative to group session, but patient declined.        Discipline Responsible: Behavorial Health Tech      Signature:  Peace Griggs

## 2024-02-19 NOTE — BH NOTE
[facility]  Discharge Note    Pt discharged with followings belongings:   Dental Appliances: None  Vision - Corrective Lenses: None  Hearing Aid: None  Jewelry: None  Body Piercings Removed: No  Clothing: Footwear, Hat, Jacket/Coat, Pants, Shirt, Socks  Other Valuables: Money, Wallet, Personal Toiletries   Valuables sent home with or returned to patient. Patient educated on aftercare instructions: yes  Information faxed to [facility] by staff  at 11:45 AM .Patient verbalize understanding of AVS:  yes.    Status EXAM upon discharge:  Mental Status and Behavioral Exam  Normal: No  Level of Assistance: Independent/Self  Facial Expression: Avoids Gaze, Flat  Affect: Blunt  Level of Consciousness: Alert  Frequency of Checks: 4 times per hour, close  Mood:Normal: No  Mood: Depressed, Anxious  Motor Activity:Normal: Yes  Motor Activity: Decreased  Eye Contact: Poor  Observed Behavior: Guarded, Withdrawn  Sexual Misconduct History: Current - no  Preception: [place] to person, [place] to time, [place] to place, [place] to situation  Attention:Normal: No  Attention: Distractible  Thought Processes: Blocking  Thought Content:Normal: No  Thought Content: Poverty of content  Depression Symptoms: Isolative, Impaired concentration  Anxiety Symptoms: Generalized  Teri Symptoms: No problems reported or observed.  Hallucinations: None  Delusions: No  Memory:Normal: Yes  Insight and Judgment: No  Insight and Judgment: Poor judgment, Poor insight    Tobacco Screening:  Practical Counseling, on admission, juan jose X, if applicable and completed (first 3 are required if patient doesn't refuse):            ( ) Recognizing danger situations (included triggers and roadblocks)                    ( ) Coping skills (new ways to manage stress,relaxation techniques, changing routine, distraction)                                                           ( ) Basic information about quitting (benefits of quitting, techniques in how to quit,

## 2024-02-19 NOTE — DISCHARGE INSTRUCTIONS
Information:  Medications:   Medication summary provided   I understand that I should take only the medications on my list.     -why and when I need to take each medicine.     -which side effects to watch for.     -that I should carry my medication information at all times in case of     Emergency situations.    I will take all of my medicines to follow up appointments.     -check with my physician or pharmacist before taking any new    Medication, over the counter product or drink alcohol.    -Ask about food, drug or dietary supplement interactions.    -discard old lists and update records with medication providers.    Notify Physician:  Notify physician if you notice:   Always call 911 if you feel your life is in danger  In case of an emergency call 911 immediately!  If 911 is not available call your local emergency medical system for help    Behavioral Health Follow Up:  Original Referral Source:[facility]   Discharge Diagnosis: Depression with suicidal ideation [F32.A, R45.851]  Recommendations for Level of Care: take medications as prescribed attend follow-up appointments   Patient status at discharge: Alert/Oriented/independent   My hospital  was: [facility]   Aftercare plan faxed: yes   -faxed by: staff   -date: 2/19/2024    -time: 1800  Prescriptions: take as prescribed    Smoking: Quit Smoking.   Call the [facility]'s smoking quitline at 9-197-16U-QUIT  Know the signs of a heart attack   If you have any of the following symptoms call 911 immediately, do not wait more    Than five minutes.    1. Pressure, fullness and/ or squeezing in the center of the chest spreading to    The jaw, neck or shoulder.    2. Chest discomfort with light headedness, fainting, sweating, nausea or    Shortness of breath.   3. Upper abdominal pressure or discomfort.   4. Lower chest pain, back pain, unusual fatigue, shortness of breath, nausea   Or dizziness.     General Information:   Questions regarding your bill: Call HELP

## 2024-02-19 NOTE — CARE COORDINATION
Name: Calixto Dobbins    : 1990    Auth number: [id number]     Discharge Date: 2024    Destination: home    Discharge Medications:      Medication List        CONTINUE taking these medications      Testosterone Cypionate 200 MG/ML Soln  Notes to patient: Hormone replacement            STOP taking these medications      ARIPiprazole 5 MG tablet  Commonly known as: ABILIFY     lisinopril 5 MG tablet  Commonly known as: PRINIVIL;ZESTRIL     pantoprazole 40 MG tablet  Commonly known as: PROTONIX     sertraline 50 MG tablet  Commonly known as: ZOLOFT     therapeutic multivitamin-minerals tablet     traZODone 50 MG tablet  Commonly known as: DESYREL     Vyvanse 50 MG capsule  Generic drug: lisdexamfetamine          Pharmacy Instructions:    Take as prescribed.  Do not consume drugs or alcohol while taking medications            Follow Up Appointment: [facility] - MAIN OFFICE  [address]  [place] [address]  [phone]  Follow up on 2024  You will be contacted with your followup appointment.       What Type Of Note Output Would You Prefer (Optional)?: Bullet Format How Severe Are Your Spot(S)?: mild Have Your Spot(S) Been Treated In The Past?: has not been treated Hpi Title: Evaluation of Skin Lesions Additional History: Referral from Dr. Gao for mole check. No personal hx of skin cancer. Hx of intense sun exposure with outdoor sports. No specific lesions of concern today. No bleeding or non-healing lesions. LS

## 2024-02-19 NOTE — DISCHARGE INSTR - DIET

## 2024-02-19 NOTE — TRANSITION OF CARE
Behavioral [facility] Transition Record to Provider    Patient Name: Calixto Dobbins  YOB: 1990   Medical Record Number: [id number]  Date of Admission: 2/16/2024  2:20 AM   Date of Discharge: 2/19/2024        Attending Provider: Diego Roberts MD   Discharging Provider: francois   To contact this individual call [phone] and ask the  to page.  If unavailable, ask to be transferred to Behavioral Health Provider on call.  A Behavioral Health Provider will be available on call 24/7 and during holidays.    Primary Care Provider: None, None    No Known Allergies    Reason for Admission: Suicidal ideations to walk into traffic    Admission Diagnosis: Depression with suicidal ideation [F32.A, R45.851]    * No surgery found *    No results found for this visit on 02/16/24.    Immunizations administered during this encounter:   There is no immunization history on file for this patient.  None of the above/Not documented/Unable to determine from medical record documentation    Screening for Metabolic Disorders for Patients on Antipsychotic Medications  (Data obtained from the EMR)    Estimated Body Mass Index  Body mass index is 33.97 kg/m².      Vital Signs/Blood Pressure  /70   Pulse 72   Temp 97.6 °F (36.4 °C) (Temporal)   Resp 14   Ht 1.854 m (6' 1\")   Wt 116.8 kg (257 lb 8 oz)   SpO2 96%   BMI 33.97 kg/m²      Fasting Blood Glucose or Hemoglobin A1c  No results found for: \"GLU\", \"GLUCPOC\"    Hemoglobin A1C   Date Value Ref Range Status   07/09/2018 5.0 % Final       Discharge Diagnosis: major depressive disorder, recurrent severe with out psychotic features     Discharge Plan/Destination: Home     Discharge Medication List and Instructions:      Medication List        CONTINUE taking these medications      Testosterone Cypionate 200 MG/ML Soln            STOP taking these medications      ARIPiprazole 5 MG tablet  Commonly known as: ABILIFY     lisinopril 5 MG tablet  Commonly known as:

## 2024-06-14 ENCOUNTER — HOSPITAL ENCOUNTER (INPATIENT)
Age: 34
LOS: 6 days | Discharge: HOME | DRG: 751 | End: 2024-06-20
Attending: PSYCHIATRY & NEUROLOGY | Admitting: PSYCHIATRY & NEUROLOGY
Payer: MEDICAID

## 2024-06-14 ENCOUNTER — HOSPITAL ENCOUNTER (EMERGENCY)
Age: 34
Discharge: ANOTHER HOSPITAL | End: 2024-06-14
Attending: EMERGENCY MEDICINE
Payer: MEDICAID

## 2024-06-14 VITALS
BODY MASS INDEX: 30.39 KG/M2 | DIASTOLIC BLOOD PRESSURE: 71 MMHG | TEMPERATURE: 97.8 F | RESPIRATION RATE: 18 BRPM | WEIGHT: 229.28 LBS | HEART RATE: 74 BPM | HEIGHT: 73 IN | SYSTOLIC BLOOD PRESSURE: 126 MMHG | OXYGEN SATURATION: 95 %

## 2024-06-14 DIAGNOSIS — R45.851 SUICIDAL IDEATION: Primary | ICD-10-CM

## 2024-06-14 LAB
ALBUMIN SERPL-MCNC: 4.3 G/DL (ref 3.5–5.2)
ALBUMIN/GLOB SERPL: 2 {RATIO} (ref 1–2.5)
ALP SERPL-CCNC: 91 U/L (ref 40–129)
ALT SERPL-CCNC: 18 U/L (ref 10–50)
AMPHET UR QL SCN: NEGATIVE
ANION GAP SERPL CALCULATED.3IONS-SCNC: 10 MMOL/L (ref 9–16)
AST SERPL-CCNC: 30 U/L (ref 10–50)
BARBITURATES UR QL SCN: NEGATIVE
BASOPHILS # BLD: 0.06 K/UL (ref 0–0.2)
BASOPHILS NFR BLD: 1 % (ref 0–2)
BENZODIAZ UR QL: NEGATIVE
BILIRUB SERPL-MCNC: 0.4 MG/DL (ref 0–1.2)
BUN SERPL-MCNC: 13 MG/DL (ref 6–20)
CALCIUM SERPL-MCNC: 9.2 MG/DL (ref 8.6–10.4)
CANNABINOIDS UR QL SCN: NEGATIVE
CHLORIDE SERPL-SCNC: 104 MMOL/L (ref 98–107)
CO2 SERPL-SCNC: 26 MMOL/L (ref 20–31)
COCAINE UR QL SCN: NEGATIVE
CREAT SERPL-MCNC: 0.9 MG/DL (ref 0.7–1.2)
EOSINOPHIL # BLD: <0.03 K/UL (ref 0–0.44)
EOSINOPHILS RELATIVE PERCENT: 0 % (ref 1–4)
ERYTHROCYTE [DISTWIDTH] IN BLOOD BY AUTOMATED COUNT: 14.4 % (ref 11.8–14.4)
ETHANOL PERCENT: 0.07 %
ETHANOLAMINE SERPL-MCNC: 71 MG/DL (ref 0–0.08)
FENTANYL UR QL: NEGATIVE
GFR, ESTIMATED: >90 ML/MIN/1.73M2
GLUCOSE SERPL-MCNC: 117 MG/DL (ref 74–99)
HCT VFR BLD AUTO: 49.3 % (ref 40.7–50.3)
HGB BLD-MCNC: 15.7 G/DL (ref 13–17)
IMM GRANULOCYTES # BLD AUTO: 0.03 K/UL (ref 0–0.3)
IMM GRANULOCYTES NFR BLD: 0 %
LYMPHOCYTES NFR BLD: 1.06 K/UL (ref 1.1–3.7)
LYMPHOCYTES RELATIVE PERCENT: 13 % (ref 24–43)
MCH RBC QN AUTO: 28.8 PG (ref 25.2–33.5)
MCHC RBC AUTO-ENTMCNC: 31.8 G/DL (ref 28.4–34.8)
MCV RBC AUTO: 90.3 FL (ref 82.6–102.9)
METHADONE UR QL: NEGATIVE
MONOCYTES NFR BLD: 0.41 K/UL (ref 0.1–1.2)
MONOCYTES NFR BLD: 5 % (ref 3–12)
NEUTROPHILS NFR BLD: 81 % (ref 36–65)
NEUTS SEG NFR BLD: 6.54 K/UL (ref 1.5–8.1)
NRBC BLD-RTO: 0 PER 100 WBC
OPIATES UR QL SCN: NEGATIVE
OXYCODONE UR QL SCN: NEGATIVE
PCP UR QL SCN: NEGATIVE
PLATELET # BLD AUTO: 215 K/UL (ref 138–453)
PMV BLD AUTO: 10.2 FL (ref 8.1–13.5)
POTASSIUM SERPL-SCNC: 4.1 MMOL/L (ref 3.7–5.3)
PROT SERPL-MCNC: 6.3 G/DL (ref 6.6–8.7)
RBC # BLD AUTO: 5.46 M/UL (ref 4.21–5.77)
SODIUM SERPL-SCNC: 140 MMOL/L (ref 136–145)
TEST INFORMATION: NORMAL
WBC OTHER # BLD: 8.1 K/UL (ref 3.5–11.3)

## 2024-06-14 PROCEDURE — 1240000000 HC EMOTIONAL WELLNESS R&B

## 2024-06-14 PROCEDURE — 6370000000 HC RX 637 (ALT 250 FOR IP): Performed by: NURSE PRACTITIONER

## 2024-06-14 PROCEDURE — 6370000000 HC RX 637 (ALT 250 FOR IP)

## 2024-06-14 PROCEDURE — 85025 COMPLETE CBC W/AUTO DIFF WBC: CPT

## 2024-06-14 PROCEDURE — 80053 COMPREHEN METABOLIC PANEL: CPT

## 2024-06-14 PROCEDURE — 99285 EMERGENCY DEPT VISIT HI MDM: CPT

## 2024-06-14 PROCEDURE — G0480 DRUG TEST DEF 1-7 CLASSES: HCPCS

## 2024-06-14 PROCEDURE — 80307 DRUG TEST PRSMV CHEM ANLYZR: CPT

## 2024-06-14 RX ORDER — GABAPENTIN 300 MG/1
300 CAPSULE ORAL ONCE
Status: COMPLETED | OUTPATIENT
Start: 2024-06-14 | End: 2024-06-14

## 2024-06-14 RX ORDER — MAGNESIUM HYDROXIDE/ALUMINUM HYDROXICE/SIMETHICONE 120; 1200; 1200 MG/30ML; MG/30ML; MG/30ML
30 SUSPENSION ORAL EVERY 6 HOURS PRN
Status: DISCONTINUED | OUTPATIENT
Start: 2024-06-14 | End: 2024-06-20 | Stop reason: HOSPADM

## 2024-06-14 RX ORDER — GABAPENTIN 300 MG/1
300 CAPSULE ORAL 2 TIMES DAILY
Status: ON HOLD | COMMUNITY

## 2024-06-14 RX ORDER — HALOPERIDOL 5 MG/ML
5 INJECTION INTRAMUSCULAR EVERY 6 HOURS PRN
Status: DISCONTINUED | OUTPATIENT
Start: 2024-06-14 | End: 2024-06-20 | Stop reason: HOSPADM

## 2024-06-14 RX ORDER — HYDROXYZINE 50 MG/1
50 TABLET, FILM COATED ORAL 3 TIMES DAILY PRN
Status: DISCONTINUED | OUTPATIENT
Start: 2024-06-14 | End: 2024-06-20 | Stop reason: HOSPADM

## 2024-06-14 RX ORDER — ACETAMINOPHEN 325 MG/1
650 TABLET ORAL EVERY 4 HOURS PRN
Status: DISCONTINUED | OUTPATIENT
Start: 2024-06-14 | End: 2024-06-20 | Stop reason: HOSPADM

## 2024-06-14 RX ORDER — POLYETHYLENE GLYCOL 3350 17 G
2 POWDER IN PACKET (EA) ORAL
Status: DISCONTINUED | OUTPATIENT
Start: 2024-06-14 | End: 2024-06-20 | Stop reason: HOSPADM

## 2024-06-14 RX ORDER — LORAZEPAM 1 MG/1
2 TABLET ORAL EVERY 6 HOURS PRN
Status: DISCONTINUED | OUTPATIENT
Start: 2024-06-14 | End: 2024-06-20 | Stop reason: HOSPADM

## 2024-06-14 RX ORDER — DIPHENHYDRAMINE HYDROCHLORIDE 50 MG/ML
50 INJECTION INTRAMUSCULAR; INTRAVENOUS EVERY 6 HOURS PRN
Status: DISCONTINUED | OUTPATIENT
Start: 2024-06-14 | End: 2024-06-20 | Stop reason: HOSPADM

## 2024-06-14 RX ORDER — TRAZODONE HYDROCHLORIDE 50 MG/1
50 TABLET ORAL NIGHTLY PRN
Status: DISCONTINUED | OUTPATIENT
Start: 2024-06-14 | End: 2024-06-20 | Stop reason: HOSPADM

## 2024-06-14 RX ORDER — LORAZEPAM 2 MG/ML
2 INJECTION INTRAMUSCULAR EVERY 6 HOURS PRN
Status: DISCONTINUED | OUTPATIENT
Start: 2024-06-14 | End: 2024-06-20 | Stop reason: HOSPADM

## 2024-06-14 RX ORDER — HALOPERIDOL 5 MG/1
5 TABLET ORAL EVERY 6 HOURS PRN
Status: DISCONTINUED | OUTPATIENT
Start: 2024-06-14 | End: 2024-06-20 | Stop reason: HOSPADM

## 2024-06-14 RX ORDER — POLYETHYLENE GLYCOL 3350 17 G/17G
17 POWDER, FOR SOLUTION ORAL DAILY PRN
Status: DISCONTINUED | OUTPATIENT
Start: 2024-06-14 | End: 2024-06-20 | Stop reason: HOSPADM

## 2024-06-14 RX ORDER — PANTOPRAZOLE SODIUM 40 MG/1
40 TABLET, DELAYED RELEASE ORAL ONCE
Status: COMPLETED | OUTPATIENT
Start: 2024-06-14 | End: 2024-06-14

## 2024-06-14 RX ADMIN — PANTOPRAZOLE SODIUM 40 MG: 40 TABLET, DELAYED RELEASE ORAL at 16:49

## 2024-06-14 RX ADMIN — GABAPENTIN 300 MG: 300 CAPSULE ORAL at 16:49

## 2024-06-14 RX ADMIN — HYDROXYZINE HYDROCHLORIDE 50 MG: 50 TABLET, FILM COATED ORAL at 19:38

## 2024-06-14 ASSESSMENT — ENCOUNTER SYMPTOMS
NAUSEA: 0
SHORTNESS OF BREATH: 0
VOMITING: 0
ABDOMINAL PAIN: 0

## 2024-06-14 ASSESSMENT — LIFESTYLE VARIABLES
HOW MANY STANDARD DRINKS CONTAINING ALCOHOL DO YOU HAVE ON A TYPICAL DAY: 1 OR 2
HOW MANY STANDARD DRINKS CONTAINING ALCOHOL DO YOU HAVE ON A TYPICAL DAY: 1 OR 2
HOW OFTEN DO YOU HAVE A DRINK CONTAINING ALCOHOL: MONTHLY OR LESS
HOW OFTEN DO YOU HAVE A DRINK CONTAINING ALCOHOL: MONTHLY OR LESS

## 2024-06-14 ASSESSMENT — PAIN - FUNCTIONAL ASSESSMENT
PAIN_FUNCTIONAL_ASSESSMENT: NONE - DENIES PAIN
PAIN_FUNCTIONAL_ASSESSMENT: NONE - DENIES PAIN

## 2024-06-14 ASSESSMENT — SLEEP AND FATIGUE QUESTIONNAIRES
DO YOU HAVE DIFFICULTY SLEEPING: YES
AVERAGE NUMBER OF SLEEP HOURS: 8
DO YOU USE A SLEEP AID: YES

## 2024-06-14 NOTE — ED NOTES
Pt resting in bed with bed in lowest position, locked, and rails up x2. Respirations even and unlabored. Belongings continue to be locked away, out of patients reach. Constant observer maintained and at patient's bedside.

## 2024-06-14 NOTE — ED NOTES
Patient was accepted by Brooklynn Bello CNP @ [facility] under Dr. Pedroza.  Dx: Depression with SI  Voluntary admission form faxed.  Room assignment: [address]  Report#: [id number]    Jimenez Barnett ETA: [id number]    EMTALA documentation completed.

## 2024-06-14 NOTE — ED NOTES
.Pt resting in bed with bed in lowest position, locked, and rails up x2. Respirations even and unlabored. Belongings continue to be locked away, out of patients reach. Constant observer maintained and at patient's bedside.

## 2024-06-14 NOTE — ED TRIAGE NOTES
PT ambulated to [facility] bed 26-b, security at bedside for search and locking of belongings and change out to paper scrubs. PT is awake alert and oriented, pt states he's been out of his psych meds and injectable testosterone for a few days d/t being in [facility].  Denies any definitive plan just \"suicidal thought\" lately.  Pt is calm and re-directalbe.

## 2024-06-14 NOTE — ED NOTES
[] [facility]    [] [facility]    [x]  [facility]    SUICIDE RISK ASSESSMENT      Y  N     [] [] In the past two weeks have you had thoughts of hurting yourself in any way?    [] [] In the past two weeks have you had thoughts that you would be better off dead?   [] [] Have you made a suicide attempt in the past two months?   [] [] Do you have a plan for hurting yourself or suicide?   [] [] Presence of hallucinations/voices related to hurting himself or herself or someone else.    SUICIDE/SECURITY WATCH PRECAUTION CHECKLIST     Orders    [x]  Suicide/Security Watch Precautions initiated as checked below:   6/14/24 2:42 PM EDT 26/H26B    [x] Notified physician:  Sofia Evans MD  6/14/24 2:42 PM EDT    [x] Orders obtained as appropriate:     [] 1:1 Observer     [] Psych Consult     [] Psych Consult    Name:  Date:  Time:    [x] 1:1 Observer, Notified by:  Lori Hu RN    Contact Nurse Supervisor    [] Remove all personal clothes from room and place in snap/paper gown/pants.  Slipper only    [] Remove all personal belongings from room and secured away from patient.    Documentation    [x] Initiate Suicide/Security Watch Precaution Flow Sheet    [x] Initiate individualized Care Plan/Problem    [x] Document why precautions initiated on flow sheet (Initiate Nursing Care Plan/Problem)    [x] 1:1 Observer in place; instructions provided.  Suicide precautions require observer be within arms length.    [x] Nurse-Observer Communication Hand-off initiated by RN, reviewed with Observer.  Subsequently used as Hand Off between Observers.    [x] Initiate every 15 minute observations per observer as delegated by the RN.    [x] Initiate RN assessment and documentation    Environmental Scan  Search Criteria and Process: OPTIONAL, see Search Policy    [] Reason for search:    [] Nursing in presence of second person to search patient    [] Patient notified of reason for body assessment and belongings

## 2024-06-14 NOTE — ED NOTES
Patient is presenting to ED for SI. Patient stated he was recently arrested for trespassing at his mother's home and cannot return. He stated a few medications were left at home and cannot retrieve them. Patient stated he is now homeless and tried to get into [facility] but they are full. Patient stated that due to all of this he is suicidal. Patient denied a plan. SW attempted to safety plan him to [facility]. Patient stated he does not want to go to [facility] and if he is discharged will kill himself. Patient did not provide a plan. Patient denied hallucinations or delusions. Patient stated he is eating and sleeping well. Patient stated he follows with [facility]. His last appt with CNP was 3 weeks ago. Dr. Banda notified. She will place ADT20 order to [facility] when medically cleared.

## 2024-06-14 NOTE — ED NOTES
Transfer Center Handoff for Behavioral Health Transfers      Patient's Current Location: [facility] ED     Chief Complaint   Patient presents with    Suicidal       Current or History of Violent Behavior: No    Currently in Restraints Now or During this Encounter: No  (Specify if Agitation or self harm is noted in ED?)  If yes, please describe behaviors requiring restraint:             Medical Clearance Documented and Verified in the Chart: Yes    No Known Allergies     Can Patient Tolerate Lying Flat: Yes    Able to Perform ADLs:  Yes  (Specify if able to ambulate or uses any mobility devices such as cane or walker)  Activity:    Level of Assistance:    Assistive Device:    Miscellaneous Devices:      LABS    CBC:   Lab Results   Component Value Date/Time    WBC 8.1 06/14/2024 01:52 PM    RBC 5.46 06/14/2024 01:52 PM    HGB 15.7 06/14/2024 01:52 PM    HCT 49.3 06/14/2024 01:52 PM    MCV 90.3 06/14/2024 01:52 PM    MCH 28.8 06/14/2024 01:52 PM    MCHC 31.8 06/14/2024 01:52 PM    RDW 14.4 06/14/2024 01:52 PM     06/14/2024 01:52 PM    MPV 10.2 06/14/2024 01:52 PM     CMP:   Lab Results   Component Value Date/Time     06/14/2024 01:52 PM    K 4.1 06/14/2024 01:52 PM     06/14/2024 01:52 PM    CO2 26 06/14/2024 01:52 PM    BUN 13 06/14/2024 01:52 PM    CREATININE 0.9 06/14/2024 01:52 PM    GFRAA >60 04/09/2019 08:20 PM    LABGLOM >90 06/14/2024 01:52 PM    LABGLOM >60 06/14/2023 12:00 PM    GLUCOSE 117 06/14/2024 01:52 PM    CALCIUM 9.2 06/14/2024 01:52 PM    BILITOT 0.4 06/14/2024 01:52 PM    ALKPHOS 91 06/14/2024 01:52 PM    AST 30 06/14/2024 01:52 PM    ALT 18 06/14/2024 01:52 PM     Drug Panel:   Lab Results   Component Value Date/Time    OPIAU NEGATIVE 06/14/2024 01:45 PM     UA:No results found for: \"COLORU\", \"APPEARANCE\", \"LABPH\", \"PROTEINU\", \"GLUCOSEU\", \"KETUA\", \"BILIRUBINUR\", \"BLOODU\", \"UROBILINOGEN\", \"NITRU\", \"LEUKOCYTESUR\", \"WBCUA\", \"RBCUA\", \"BACTERIA\"  PREGNANCY TEST: No  results found for: \"PREGTESTUR\"    Dialysis: No    Target Destination: [facility]    Insurance: Payor: [facility] /  /  /      Current Psychotic Symptoms  Harmful Actions Towards Others:    Orientation Level:    Speech Pattern:    General Attitude:    Emotions:    Delusions:    Hallucination:       Any Suicidal Ideations: Low Risk    Homicidal Ideations/Violence Risk:   Observed Violent Behavior: No  Homicidal Ideations:      Past Psychiatric History:       Diagnosis Date    ADD (attention deficit disorder)     not sure of diag, was on adderall in college, then approx 6 mos util 3/2017    Cocaine abuse (HCC)     Concussion     Depressive disorder     was put on med at time hospitalization, [facility] psych, took celexa x sev wks only.     Murmur        Hold (TDO/Involuntary Hold): No    The patient is currently receiving care for the above psychiatric illness.     Home Medications  Does Patient Have Medications to Bring to the Facility: No  Medications Prior to Admission:   Prior to Admission Medications   Prescriptions Last Dose Informant Patient Reported? Taking?   Testosterone Cypionate 200 MG/ML SOLN   Yes No   Sig: Inject 300 mg into the muscle once a week Indications: Mondays      Facility-Administered Medications: None          Additional Information  Isolation:      HIV Positive: no    Oxygen Use: No    Any Legal Issues (Current or Past): yes    Does Patient have POA or Guardian: No    Current Living Situation:  Homeless    Roommate Appropriate: Yes    Is this a special case or have any other considerations:  No  (pregnancy, autism, developmental disabled, etc.)    Does the patient have confirmed or suspected COVID-19 Symptoms: No  (if yes, test must be completed, if no test is not required)       Last COVID Lab No results found for: \"SARS-COV-2\", \"SARS-COV-2 RNA\", \"SARS-COV-2 RNA, RT PCR\", \"SARS-COV-2, DYLAN\", \"SARS-COV-2, NAAT\", \"SARS-COV-2 BY PCR\", \"RAPID\", \"SARS-COV-2, RAPID\", \"SALIVA\",

## 2024-06-14 NOTE — ED PROVIDER NOTES
[facility]     Emergency Department     Faculty Attestation    I performed a history and physical examination of the patient and discussed management with the resident. I reviewed the resident’s note and agree with the documented findings and plan of care. Any areas of disagreement are noted on the chart. I was personally present for the key portions of any procedures. I have documented in the chart those procedures where I was not present during the key portions. I have reviewed the emergency nurses triage note. I agree with the chief complaint, past medical history, past surgical history, allergies, medications, social and family history as documented unless otherwise noted below. Documentation of the HPI, Physical Exam and Medical Decision Making performed by medical students or scribes is based on my personal performance of the HPI, PE and MDM. For Physician Assistant/ Nurse Practitioner cases/documentation I have personally evaluated this patient and have completed at least one if not all key elements of the E/M (history, physical exam, and MDM). Additional findings are as noted.    Vital signs:   Vitals:    06/14/24 1329   BP: 138/80   Pulse: 93   Resp: 18   Temp: 97 °F (36.1 °C)   SpO2: 96%      Suicidal ideation. No somatic complaints. Plan for I labs, social work.             Sofia Evans M.D,  Attending Emergency  Physician            Sofia Evans MD  06/14/24 [id number]

## 2024-06-14 NOTE — ED PROVIDER NOTES
[facility] ED  Emergency Department Encounter  Emergency Medicine Resident     Pt Name:Calixto Dobbins  MRN: [id number]  Birthdate 1990  Date of evaluation: 6/14/24  PCP:  None, None  Note Started: 1:35 PM EDT      CHIEF COMPLAINT       Chief Complaint   Patient presents with    Suicidal       HISTORY OF PRESENT ILLNESS  (Location/Symptom, Timing/Onset, Context/Setting, Quality, Duration, Modifying Factors, Severity.)      Calixto Dobbins is a 34 y.o. male who presents with suicidal ideations.  Patient reports he has been having thoughts of suicide for the last few days, does not have any plan in particular, just the feelings.  He does have a history of SI, attempts, previous admissions to [facility].  He reports that he recently got out of senior living and has been out of some of his psychiatric medications.  He also normally takes testosterone and the senior living did not release him with his syringes so he has been unable to take it; he states that when he does not take his testosterone he sometimes feels \"off\" like this.  He drink a single beer earlier today.  Denies any other drug use today.  Reports using IV drugs over 20 years ago, nothing recently.    PAST MEDICAL / SURGICAL / SOCIAL / FAMILY HISTORY      has a past medical history of ADD (attention deficit disorder), Cocaine abuse (HCC), Concussion, Depressive disorder, and Murmur.     has no past surgical history on file.    Social History     Socioeconomic History    Marital status: Single     Spouse name: Not on file    Number of children: Not on file    Years of education: Not on file    Highest education level: Not on file   Occupational History    Not on file   Tobacco Use    Smoking status: Former     Current packs/day: 0.25     Average packs/day: 0.3 packs/day for 3.0 years (0.8 ttl pk-yrs)     Types: Cigarettes    Smokeless tobacco: Never   Vaping Use    Vaping Use: Never used   Substance and Sexual Activity    Alcohol use: Not    All other systems reviewed and are negative.    PHYSICAL EXAM      INITIAL VITALS:   /71   Pulse 74   Temp 97.8 °F (36.6 °C) (Oral)   Resp 18   Ht 1.854 m (6' 1\")   Wt 104 kg (229 lb 4.5 oz)   SpO2 95%   BMI 30.25 kg/m²     Physical Exam  Vitals and nursing note reviewed.   Constitutional:       General: He is not in acute distress.     Appearance: Normal appearance.   Eyes:      Extraocular Movements: Extraocular movements intact.      Pupils: Pupils are equal, round, and reactive to light.   Cardiovascular:      Rate and Rhythm: Normal rate and regular rhythm.   Pulmonary:      Effort: Pulmonary effort is normal. No respiratory distress.      Breath sounds: Normal breath sounds.   Abdominal:      General: Abdomen is flat.      Palpations: Abdomen is soft.      Tenderness: There is no abdominal tenderness. There is no guarding or rebound.   Musculoskeletal:         General: Normal range of motion.   Skin:     General: Skin is warm and dry.   Neurological:      General: No focal deficit present.      Mental Status: He is alert and oriented to person, place, and time.      Cranial Nerves: No cranial nerve deficit.   Psychiatric:         Behavior: Behavior normal.       DDX/DIAGNOSTIC RESULTS / EMERGENCY DEPARTMENT COURSE / MDM     Medical Decision Making  Mr Dobbins is a 33yo male with a history of MDD, substance use presenting with suicidal ideation.  Patient has reportedly been having some social stressors, recently Stu from shelter, not allowed near his home where his medication is.  He has not taken any of his antidepressants nor his testosterone in several days.  Patient reports that when he misses his testosterone he sometimes feels like this.  He does confirm thoughts of hurting himself but does not have a plan at this time.  Patient has been to [facility] in the past and would like to go back there.  He does not have any hallucinations, homicidal ideations.  Will obtain [facility] labs, consult

## 2024-06-15 PROCEDURE — 6370000000 HC RX 637 (ALT 250 FOR IP): Performed by: NURSE PRACTITIONER

## 2024-06-15 PROCEDURE — 6360000002 HC RX W HCPCS: Performed by: NURSE PRACTITIONER

## 2024-06-15 PROCEDURE — APPSS60 APP SPLIT SHARED TIME 46-60 MINUTES: Performed by: NURSE PRACTITIONER

## 2024-06-15 PROCEDURE — 6370000000 HC RX 637 (ALT 250 FOR IP): Performed by: PSYCHIATRY & NEUROLOGY

## 2024-06-15 PROCEDURE — 99222 1ST HOSP IP/OBS MODERATE 55: CPT | Performed by: PSYCHIATRY & NEUROLOGY

## 2024-06-15 PROCEDURE — 99222 1ST HOSP IP/OBS MODERATE 55: CPT | Performed by: INTERNAL MEDICINE

## 2024-06-15 PROCEDURE — 1240000000 HC EMOTIONAL WELLNESS R&B

## 2024-06-15 RX ORDER — TESTOSTERONE CYPIONATE 200 MG/ML
300 INJECTION, SOLUTION INTRAMUSCULAR WEEKLY
Status: DISCONTINUED | OUTPATIENT
Start: 2024-06-15 | End: 2024-06-20 | Stop reason: HOSPADM

## 2024-06-15 RX ORDER — GABAPENTIN 300 MG/1
300 CAPSULE ORAL 2 TIMES DAILY
Status: DISCONTINUED | OUTPATIENT
Start: 2024-06-15 | End: 2024-06-15

## 2024-06-15 RX ORDER — GABAPENTIN 300 MG/1
300 CAPSULE ORAL 2 TIMES DAILY
Status: DISCONTINUED | OUTPATIENT
Start: 2024-06-15 | End: 2024-06-20 | Stop reason: HOSPADM

## 2024-06-15 RX ORDER — SERTRALINE HYDROCHLORIDE 25 MG/1
25 TABLET, FILM COATED ORAL DAILY
Status: DISCONTINUED | OUTPATIENT
Start: 2024-06-15 | End: 2024-06-17

## 2024-06-15 RX ORDER — OMEPRAZOLE 20 MG/1
40 CAPSULE, DELAYED RELEASE ORAL DAILY
COMMUNITY

## 2024-06-15 RX ORDER — DEXTROAMPHETAMINE SACCHARATE, AMPHETAMINE ASPARTATE, DEXTROAMPHETAMINE SULFATE AND AMPHETAMINE SULFATE 5; 5; 5; 5 MG/1; MG/1; MG/1; MG/1
20 TABLET ORAL DAILY
Status: ON HOLD | COMMUNITY
End: 2024-06-19 | Stop reason: HOSPADM

## 2024-06-15 RX ORDER — PANTOPRAZOLE SODIUM 40 MG/1
40 TABLET, DELAYED RELEASE ORAL
Status: DISCONTINUED | OUTPATIENT
Start: 2024-06-15 | End: 2024-06-20 | Stop reason: HOSPADM

## 2024-06-15 RX ORDER — DEXTROAMPHETAMINE SACCHARATE, AMPHETAMINE ASPARTATE MONOHYDRATE, DEXTROAMPHETAMINE SULFATE AND AMPHETAMINE SULFATE 3.75; 3.75; 3.75; 3.75 MG/1; MG/1; MG/1; MG/1
30 CAPSULE, EXTENDED RELEASE ORAL
Status: ON HOLD | COMMUNITY
End: 2024-06-19 | Stop reason: HOSPADM

## 2024-06-15 RX ADMIN — GABAPENTIN 300 MG: 300 CAPSULE ORAL at 13:38

## 2024-06-15 RX ADMIN — SERTRALINE 25 MG: 25 TABLET, FILM COATED ORAL at 13:38

## 2024-06-15 RX ADMIN — TESTOSTERONE CYPIONATE 300 MG: 200 INJECTION, SOLUTION INTRAMUSCULAR at 14:10

## 2024-06-15 RX ADMIN — HYDROXYZINE HYDROCHLORIDE 50 MG: 50 TABLET, FILM COATED ORAL at 20:32

## 2024-06-15 RX ADMIN — PANTOPRAZOLE SODIUM 40 MG: 40 TABLET, DELAYED RELEASE ORAL at 13:38

## 2024-06-15 ASSESSMENT — PATIENT HEALTH QUESTIONNAIRE - PHQ9
SUM OF ALL RESPONSES TO PHQ QUESTIONS 1-9: 3
SUM OF ALL RESPONSES TO PHQ9 QUESTIONS 1 & 2: 3
10. IF YOU CHECKED OFF ANY PROBLEMS, HOW DIFFICULT HAVE THESE PROBLEMS MADE IT FOR YOU TO DO YOUR WORK, TAKE CARE OF THINGS AT HOME, OR GET ALONG WITH OTHER PEOPLE: VERY DIFFICULT
9. THOUGHTS THAT YOU WOULD BE BETTER OFF DEAD, OR OF HURTING YOURSELF: MORE THAN HALF THE DAYS
8. MOVING OR SPEAKING SO SLOWLY THAT OTHER PEOPLE COULD HAVE NOTICED. OR THE OPPOSITE, BEING SO FIGETY OR RESTLESS THAT YOU HAVE BEEN MOVING AROUND A LOT MORE THAN USUAL: NOT AT ALL
3. TROUBLE FALLING OR STAYING ASLEEP: NEARLY EVERY DAY
2. FEELING DOWN, DEPRESSED OR HOPELESS: MORE THAN HALF THE DAYS
SUM OF ALL RESPONSES TO PHQ QUESTIONS 1-9: 3
SUM OF ALL RESPONSES TO PHQ QUESTIONS 1-9: 8
SUM OF ALL RESPONSES TO PHQ QUESTIONS 1-9: 8
SUM OF ALL RESPONSES TO PHQ QUESTIONS 1-9: 3
SUM OF ALL RESPONSES TO PHQ QUESTIONS 1-9: 8
1. LITTLE INTEREST OR PLEASURE IN DOING THINGS: SEVERAL DAYS
5. POOR APPETITE OR OVEREATING: NOT AT ALL
6. FEELING BAD ABOUT YOURSELF - OR THAT YOU ARE A FAILURE OR HAVE LET YOURSELF OR YOUR FAMILY DOWN: MORE THAN HALF THE DAYS
7. TROUBLE CONCENTRATING ON THINGS, SUCH AS READING THE NEWSPAPER OR WATCHING TELEVISION: NOT AT ALL
SUM OF ALL RESPONSES TO PHQ QUESTIONS 1-9: 3
SUM OF ALL RESPONSES TO PHQ QUESTIONS 1-9: 6

## 2024-06-15 ASSESSMENT — LIFESTYLE VARIABLES
HOW MANY STANDARD DRINKS CONTAINING ALCOHOL DO YOU HAVE ON A TYPICAL DAY: PATIENT DOES NOT DRINK
HOW OFTEN DO YOU HAVE A DRINK CONTAINING ALCOHOL: NEVER

## 2024-06-15 NOTE — GROUP NOTE
Group Therapy Note    Date: 6/15/2024    Group Start Time: 1400  Group End Time: 1445  Group Topic: Psychoeducation    [facility]    Gaby Jose CTRS    Group Therapy Note    Attendees: 9/18     Patient's Goal:  Patient will demonstrate improved interpersonal skills    Notes:  Patient attended group and participated.    Status After Intervention:  Improved    Participation Level: Active Listener and Interactive    Participation Quality: Appropriate, Attentive, and Sharing      Speech:  normal      Thought Process/Content: Logical  Linear      Affective Functioning: Constricted/Restricted      Mood: dysphoric      Level of consciousness:  Alert, Oriented x4, and Attentive      Response to Learning: Able to verbalize current knowledge/experience, Able to verbalize/acknowledge new learning, Able to change behavior, and Progressing to goal      Endings: None Reported    Modes of Intervention: Education, Support, Socialization, and Exploration      Discipline Responsible: Psychoeducational Specialist      Signature:  RADHA Kwan

## 2024-06-15 NOTE — GROUP NOTE
Group Therapy Note    Date: 6/15/2024    Group Start Time: 1030  Group End Time: 1120  Group Topic: Psychoeducation    Christel Perkins, FITZW        Group Therapy Note    Attendees: 5/18       patient refused to attend psychoeducation group at 1030a after encouragement from staff.  1:1 talk time provided as alternative to group session

## 2024-06-15 NOTE — H&P
[facility] Internal Medicine  Francisco Gregg MD; Jerome Mayorga MD, Pedro Pablo Coulter MD, Cynthia Wolff MD, Lon Almanzar MD; Malik Cash MD    [facility] Internal Medicine   IN-PATIENT SERVICE   [facility]     HISTORY AND PHYSICAL EXAMINATION            Date:   6/15/2024  Patient name:  Calixto Dobbins  Date of admission:  6/14/2024  6:48 PM  MRN:   [id number]  Account:  [id number]  YOB: 1990  PCP:    None, None  Room:   [address] [place] [address]  Code Status:    Full Code      Chief Complaint:     Mental health disorder    History Obtained From:     Patient/EMR/bedside RN     History of Present Illness:     34-year-old gentleman class I obese BMI is 30 history of depression street drug abuse admitted for mental health disorder denies any fever chills nausea vomiting no chest pain no dyspnea  Denies any history of HIV tuberculosis no significant weight loss or weight gain    Past Medical History:     Past Medical History:   Diagnosis Date    ADD (attention deficit disorder)     not sure of diag, was on adderall in college, then approx 6 mos util 3/2017    Cocaine abuse (HCC)     Concussion     Depressive disorder     was put on med at time hospitalization, [facility] psych, took celexa x sev wks only.     Murmur         Past Surgical History:     No past surgical history on file.     Medications Prior to Admission:     Prior to Admission medications    Medication Sig Start Date End Date Taking? Authorizing Provider   amphetamine-dextroamphetamine (ADDERALL) 20 MG tablet Take 1 tablet by mouth daily. Max Daily Amount: 20 mg   Yes Briseyda Darden MD   amphetamine-dextroamphetamine (ADDERALL XR) 15 MG extended release capsule Take 2 capsules by mouth Daily with lunch. Max Daily Amount: 30 mg   Yes Briseyda Darden MD   omeprazole (PRILOSEC) 20 MG delayed release capsule Take 2 capsules by mouth daily   Yes Briseyda Darden MD   gabapentin 
is experiencing a depressed mood for the last 2 weeks secondary to feelings of hopelessness due to being homeless.  He reports anhedonia, poor sleep, little energy and motivation.  He has been eating and sleeping less for the last few days which he attributes to beiing on the street. Patient denies visual or auditory hallucinations. He says that he has been told that he was bipolar in the past with one brief episode of decreased sleep and higher than normal energy level for about 3 days. Denies inflated self esteem, racing thoughts, elated mood, impulsivity during this.  Also unable to identify if he has been sober from cocaine use during that time.  Patient denies any history of perceptual disturbances or psychotic phenomena.    Reviewed labs and vitals.  Urine toxicology is negative.  Noted that patient does have an active prescription for Adderall, but amphetamine is negative.  As noted above, patient reports that he has not been able to have access to his medications since being kicked out of his mother's home.  He does have extensive history of cocaine use, but states that he has been sober for the last few months.  He endorses infrequent alcohol use, less than once weekly, 1 or 2 alcoholic beverages per session.  Liver enzymes within normal limits and EtOH less than 0.01%.  Not indicative of chronic or sustained alcohol use.    Patient requires inpatient hospitalization for safety and stability secondary to his suicidal thoughts at this time.           History of head trauma: [] Yes [x] No    History of seizures: [] Yes [x] No    History of violence or aggression: [x] Yes [] No         PSYCHIATRIC HISTORY:  [x] Yes [] No    Currently follows with [facility]  0 lifetime suicide attempts  3 psychiatric hospital admissions with last 6/2023    Home Medication Compliance: [x] Yes [] No    Past psychiatric medications includes: Adderal, Trazodone, Celexa, Wellbutrin, Strattera, gabapentin    Adverse reactions from

## 2024-06-15 NOTE — CARE COORDINATION
[facility] Biopsychosocial Assessment    Current Level of Psychosocial Functioning     Independent xxx  Dependent    Minimal Assist     Comments:    Psychosocial High Risk Factors (check all that apply)    Unable to obtain meds   Chronic illness/pain    Substance abuse xx  Lack of Family Support   Financial stress xx  Isolation   Inadequate Community Resources xx  Suicide attempt(s)  Not taking medications  xx  Victim of crime   Developmental Delay  Unable to manage personal needs    Age 65 or older   Homeless  No transportation   Readmission within 30 days  Unemployment  Traumatic Event    Comments:   Psychiatric Advanced Directives: none reported     Family to Involve in Treatment: states his mom is supportive     Sexual Orientation:  n/a    Patient Strengths: linked with [facility]     Patient Barriers: reports he is currently homeless, has pending trespassing charge      Opiate Education Provided:  denies       CMHC/mental health history: linked with [facility]     Plan of Care   medication management, group/individual therapies, family meetings, psycho -education, treatment team meetings to assist with stabilization    Initial Discharge Plan:  will need emergency shelter resources, is homeless       Clinical Summary:  Calixto is a 34 year old single male who has been admitted to [facility] with report of suicidal ideation.   Calixto reports he is recently homeless after losing his housing with his mom, states he attempted to return there to collect his belongings and is now charged with trespass due to return. Calixto states his mom is supportive but she asked him to leave her home because his aunt is living with mom and asked mom to evict Calixto. He denies any AOD issues, denied need for AOD resources. Ongoing support and encouragement offered.

## 2024-06-16 PROCEDURE — 6370000000 HC RX 637 (ALT 250 FOR IP): Performed by: NURSE PRACTITIONER

## 2024-06-16 PROCEDURE — 6370000000 HC RX 637 (ALT 250 FOR IP): Performed by: PSYCHIATRY & NEUROLOGY

## 2024-06-16 PROCEDURE — 99232 SBSQ HOSP IP/OBS MODERATE 35: CPT | Performed by: INTERNAL MEDICINE

## 2024-06-16 PROCEDURE — APPSS30 APP SPLIT SHARED TIME 16-30 MINUTES: Performed by: NURSE PRACTITIONER

## 2024-06-16 PROCEDURE — 1240000000 HC EMOTIONAL WELLNESS R&B

## 2024-06-16 PROCEDURE — 99232 SBSQ HOSP IP/OBS MODERATE 35: CPT | Performed by: PSYCHIATRY & NEUROLOGY

## 2024-06-16 RX ADMIN — PANTOPRAZOLE SODIUM 40 MG: 40 TABLET, DELAYED RELEASE ORAL at 07:33

## 2024-06-16 RX ADMIN — SERTRALINE 25 MG: 25 TABLET, FILM COATED ORAL at 07:33

## 2024-06-16 RX ADMIN — HYDROXYZINE HYDROCHLORIDE 50 MG: 50 TABLET, FILM COATED ORAL at 22:07

## 2024-06-16 RX ADMIN — HYDROXYZINE HYDROCHLORIDE 50 MG: 50 TABLET, FILM COATED ORAL at 16:28

## 2024-06-16 RX ADMIN — GABAPENTIN 300 MG: 300 CAPSULE ORAL at 07:33

## 2024-06-16 RX ADMIN — GABAPENTIN 300 MG: 300 CAPSULE ORAL at 15:05

## 2024-06-16 NOTE — GROUP NOTE
Group Therapy Note    Date: 6/16/2024    Group Start Time: 1035  Group End Time: 1115  Group Topic: Psychoeducation    Christel Perkins, LSW        Group Therapy Note    Attendees: 6/20       patient refused to attend psychoeducation group at 1035a after encouragement from staff.  1:1 talk time provided as alternative to group session

## 2024-06-16 NOTE — GROUP NOTE
Psych-Ed/Relapse Prevention Group Note        Date: June 16, 2024 Start Time: 2pm  End Time:  2:45pm      Number of Participants in Group & Unit Census:  6/19    Topic: Leisure and Wellness    Goal of Group:Patient will identify benefits of leisure for coping.  Patient will identify ways to improve overall health and wellness.        Comments:     Patient did not participate in Psych-Ed/Relapse Prevention group, despite staff encouragement and explanation of benefits.  Patient remain seclusive to self.  Q15 minute safety checks maintained for patient safety and will continue to encourage patient to attend unit programming.         Signature:  ALEX KwanS

## 2024-06-17 PROCEDURE — 1240000000 HC EMOTIONAL WELLNESS R&B

## 2024-06-17 PROCEDURE — 99232 SBSQ HOSP IP/OBS MODERATE 35: CPT | Performed by: PSYCHIATRY & NEUROLOGY

## 2024-06-17 PROCEDURE — 99231 SBSQ HOSP IP/OBS SF/LOW 25: CPT | Performed by: INTERNAL MEDICINE

## 2024-06-17 PROCEDURE — 6370000000 HC RX 637 (ALT 250 FOR IP): Performed by: NURSE PRACTITIONER

## 2024-06-17 PROCEDURE — 6370000000 HC RX 637 (ALT 250 FOR IP): Performed by: PSYCHIATRY & NEUROLOGY

## 2024-06-17 RX ORDER — LANOLIN ALCOHOL/MO/W.PET/CERES
3 CREAM (GRAM) TOPICAL NIGHTLY PRN
Status: DISCONTINUED | OUTPATIENT
Start: 2024-06-17 | End: 2024-06-20 | Stop reason: HOSPADM

## 2024-06-17 RX ADMIN — GABAPENTIN 300 MG: 300 CAPSULE ORAL at 07:54

## 2024-06-17 RX ADMIN — HYDROXYZINE HYDROCHLORIDE 50 MG: 50 TABLET, FILM COATED ORAL at 19:24

## 2024-06-17 RX ADMIN — PANTOPRAZOLE SODIUM 40 MG: 40 TABLET, DELAYED RELEASE ORAL at 07:54

## 2024-06-17 RX ADMIN — HYDROXYZINE HYDROCHLORIDE 50 MG: 50 TABLET, FILM COATED ORAL at 13:36

## 2024-06-17 RX ADMIN — Medication 3 MG: at 21:03

## 2024-06-17 RX ADMIN — GABAPENTIN 300 MG: 300 CAPSULE ORAL at 15:07

## 2024-06-17 RX ADMIN — POLYETHYLENE GLYCOL 3350 17 G: 17 POWDER, FOR SOLUTION ORAL at 09:01

## 2024-06-17 NOTE — GROUP NOTE
Group Therapy Note    Date: 6/17/2024    Group Start Time: 1050  Group End Time: 1130  Group Topic: Cognitive Skills    [facility] Layla Munoz CTRS        Group Therapy Note    Attendees: 6/20       Patient's Goal:  pt will demonstrate improved coping skills and improved communication skills     Notes:   pt was pleasant and participated well    Status After Intervention:  Improved    Participation Level: Active Listener and Interactive    Participation Quality: Appropriate and Supportive      Speech:  normal      Thought Process/Content: Logical      Affective Functioning: Congruent      Mood: euthymic      Level of consciousness:  Alert      Response to Learning: Able to verbalize current knowledge/experience, Capable of insight, and Progressing to goal      Endings: None Reported    Modes of Intervention: Education, Support, and Activity      Discipline Responsible: Psychoeducational Specialist      Signature:  RADHA GARCIA

## 2024-06-17 NOTE — GROUP NOTE
Group Therapy Note    Date: 6/17/2024    Group Start Time: 1000  Group End Time: 1040  Group Topic: Psychotherapy    [facility]    Marcela Joseph MSW, SUKHDEEP        Group Therapy Note    Attendees: 7/21       Patient's Goal:  Increase interpersonal relationship skills utilizing talk therapy while discussing positive coping skills for anxiety.      Status After Intervention:  Improved    Participation Level: Active Listener and Interactive    Participation Quality: Appropriate, Attentive, and Sharing      Speech:  normal      Thought Process/Content: Logical      Affective Functioning: Congruent      Mood: euthymic      Level of consciousness:  Alert, Oriented x4, and Attentive      Response to Learning: Able to verbalize current knowledge/experience, Able to verbalize/acknowledge new learning, and Able to retain information      Endings: None Reported    Modes of Intervention: Education, Support, and Socialization      Discipline Responsible: /Counselor      Signature:  RALPH Dye LSW

## 2024-06-18 PROCEDURE — 6370000000 HC RX 637 (ALT 250 FOR IP): Performed by: PSYCHIATRY & NEUROLOGY

## 2024-06-18 PROCEDURE — 1240000000 HC EMOTIONAL WELLNESS R&B

## 2024-06-18 PROCEDURE — 99232 SBSQ HOSP IP/OBS MODERATE 35: CPT | Performed by: PSYCHIATRY & NEUROLOGY

## 2024-06-18 PROCEDURE — 6370000000 HC RX 637 (ALT 250 FOR IP): Performed by: NURSE PRACTITIONER

## 2024-06-18 RX ADMIN — GABAPENTIN 300 MG: 300 CAPSULE ORAL at 08:12

## 2024-06-18 RX ADMIN — PANTOPRAZOLE SODIUM 40 MG: 40 TABLET, DELAYED RELEASE ORAL at 08:13

## 2024-06-18 RX ADMIN — GABAPENTIN 300 MG: 300 CAPSULE ORAL at 15:44

## 2024-06-18 RX ADMIN — HYDROXYZINE HYDROCHLORIDE 50 MG: 50 TABLET, FILM COATED ORAL at 11:53

## 2024-06-18 RX ADMIN — Medication 3 MG: at 20:37

## 2024-06-18 RX ADMIN — HYDROXYZINE HYDROCHLORIDE 50 MG: 50 TABLET, FILM COATED ORAL at 17:37

## 2024-06-18 NOTE — GROUP NOTE
Group Therapy Note    Date: 6/18/2024    Group Start Time: 1050  Group End Time: 1130  Group Topic: Cognitive Skills    [facility] Layla Munoz CTRS        Group Therapy Note    Attendees: 10/20       Patient's Goal:  pt will demonstrate improved coping skills and improved communication skills     Notes:   pt was pleasant and participated well    Status After Intervention:  Improved    Participation Level: Active Listener and Interactive    Participation Quality: Appropriate and Supportive      Speech:  normal      Thought Process/Content: Logical      Affective Functioning: Congruent      Mood: euthymic      Level of consciousness:  Alert      Response to Learning: Able to verbalize current knowledge/experience, Capable of insight, and Progressing to goal      Endings: None Reported    Modes of Intervention: Education, Support, and Activity      Discipline Responsible: Psychoeducational Specialist      Signature:  RADHA GARCIA

## 2024-06-18 NOTE — GROUP NOTE
Group Therapy Note    Date: 6/18/2024    Group Start Time: 1330  Group End Time: 1415  Group Topic: recreation group     CZ BHLayla Virk CTRS      Cognitive Skills Group Note        Date: June 1&, 2024 Start Time: 130pm End Time:  215pm      Number of Participants in Group & Unit Census:  7/20    Topic: recreation group     Goal of Group: pt will demonstrate improved coping skills and improved leisure awareness      Comments:     Patient did not participate in recreation group, despite staff encouragement and explanation of benefits.  Patient remain seclusive to self.  Q15 minute safety checks maintained for patient safety and will continue to encourage patient to attend unit programming.              Signature:  RADHA GARCIA

## 2024-06-18 NOTE — GROUP NOTE
Group Therapy Note    Date: 6/18/2024    Group Start Time: 0900  Group End Time: 0930  Group Topic: Orientation Group    Dasia Abreu LPN        Group Therapy Note    Attendees: 13/19       Patient's Goal:  goal setting    Notes:   Patient verbalized the understanding of having goals.    Status After Intervention:  Improved    Participation Level: Active    Participation Quality: Appropriate, Attentive, and Sharing      Speech:  normal      Thought Process/Content: Logical      Affective Functioning: Congruent      Mood: anxious      Level of consciousness:  Alert and Attentive      Response to Learning: Able to verbalize current knowledge/experience, Able to retain information, and Capable of insight      Endings: None Reported    Modes of Intervention: Education, Support, and Socialization      Discipline Responsible: Licensed Practical Nurse      Signature:  Dasia Gao LPN

## 2024-06-18 NOTE — GROUP NOTE
Group Therapy Note    Date: 6/18/2024    Group Start Time: 2000  Group End Time: 2130  Group Topic: Relaxation    Patricia Jolly        Group Therapy Note    Attendees: 8/15         Status After Intervention:  Unchanged    Participation Level: Minimal    Speech:  normal      Mood: anxious      Level of consciousness:  Alert and Oriented x4      Response to Learning: Able to verbalize current knowledge/experience      Endings: None Reported    Modes of Intervention: Support, Socialization, and Activity      Discipline Responsible: [employer]      Signature:  Patricia Morrison

## 2024-06-18 NOTE — GROUP NOTE
Group Therapy Note    Date: 6/18/2024    Group Start Time: 1002  Group End Time: 1055  Group Topic: Psychotherapy    [facility] Marcela Ware MSW, LSW        Group Therapy Note    Attendees: 9/20       Patient's Goal:  Increase interpersonal relationship skills utilizing talk therapy while discussing positive coping skills for Depression.      Status After Intervention:  Improved    Participation Level: Active Listener and Interactive    Participation Quality: Appropriate, Attentive, and Sharing      Speech:  normal      Thought Process/Content: Logical      Affective Functioning: Congruent      Mood: euphoric      Level of consciousness:  Alert, Oriented x4, and Attentive      Response to Learning: Able to verbalize current knowledge/experience, Able to verbalize/acknowledge new learning, and Able to retain information      Endings: None Reported    Modes of Intervention: Education, Support, and Socialization      Discipline Responsible: /Counselor      Signature:  RALPH Dye LSW

## 2024-06-19 PROBLEM — F29 PSYCHOSIS (HCC): Status: ACTIVE | Noted: 2024-06-19

## 2024-06-19 PROCEDURE — 6370000000 HC RX 637 (ALT 250 FOR IP): Performed by: PSYCHIATRY & NEUROLOGY

## 2024-06-19 PROCEDURE — 99232 SBSQ HOSP IP/OBS MODERATE 35: CPT | Performed by: PSYCHIATRY & NEUROLOGY

## 2024-06-19 PROCEDURE — 1240000000 HC EMOTIONAL WELLNESS R&B

## 2024-06-19 PROCEDURE — 99231 SBSQ HOSP IP/OBS SF/LOW 25: CPT | Performed by: INTERNAL MEDICINE

## 2024-06-19 PROCEDURE — 6370000000 HC RX 637 (ALT 250 FOR IP): Performed by: NURSE PRACTITIONER

## 2024-06-19 RX ADMIN — HYDROXYZINE HYDROCHLORIDE 50 MG: 50 TABLET, FILM COATED ORAL at 20:53

## 2024-06-19 RX ADMIN — PANTOPRAZOLE SODIUM 40 MG: 40 TABLET, DELAYED RELEASE ORAL at 08:30

## 2024-06-19 RX ADMIN — GABAPENTIN 300 MG: 300 CAPSULE ORAL at 08:30

## 2024-06-19 RX ADMIN — HYDROXYZINE HYDROCHLORIDE 50 MG: 50 TABLET, FILM COATED ORAL at 15:37

## 2024-06-19 RX ADMIN — GABAPENTIN 300 MG: 300 CAPSULE ORAL at 15:37

## 2024-06-19 RX ADMIN — Medication 3 MG: at 20:53

## 2024-06-19 NOTE — GROUP NOTE
Psych-Ed/Relapse Prevention Group Note        Date: June 19, 2024 Start Time: 2:30pm  End Time:  3:15pm      Number of Participants in Group & Unit Census:  9/17    Topic: Coping skills, Support and Self-care    Goal of Group:Patient will identify healthy habits for self care and coping.  Patient will identify benefits of healthy support.       Comments:     Patient did not participate in Psych-Ed/Relapse Prevention group, despite staff encouragement and explanation of benefits.  Patient remain seclusive to self.  Q15 minute safety checks maintained for patient safety and will continue to encourage patient to attend unit programming.         Signature:  ALEX KwanS

## 2024-06-19 NOTE — GROUP NOTE
Group Therapy Note    Date: 6/19/2024    Group Start Time: 1000  Group End Time: 1045  Group Topic: Psychotherapy    [facility] MS[facility]        Group Therapy Note    Attendees: 9/19     Patient was offered group therapy today but declined to participate despite encouragement from staff.  1:1 was offered.    Discipline Responsible: /Counselor      Signature:  RALPH Carranza

## 2024-06-19 NOTE — GROUP NOTE
Psych-Ed/Relapse Prevention Group Note        Date: June 19, 2024 Start Time: 11am  End Time: 11:45am      Number of Participants in Group & Unit Census:  6/18    Topic: Leisure skills    Goal of Group:Patient will identify benefits of leisure for coping and stress management      Comments:     Patient did not participate in Psych-Ed/Relapse Prevention group, despite staff encouragement and explanation of benefits.  Patient remain seclusive to self.  Q15 minute safety checks maintained for patient safety and will continue to encourage patient to attend unit programming.         Signature:  Gaby Jose, CTRS

## 2024-06-20 VITALS
TEMPERATURE: 96.9 F | HEART RATE: 53 BPM | OXYGEN SATURATION: 97 % | HEIGHT: 73 IN | RESPIRATION RATE: 18 BRPM | BODY MASS INDEX: 30.48 KG/M2 | SYSTOLIC BLOOD PRESSURE: 130 MMHG | WEIGHT: 230 LBS | DIASTOLIC BLOOD PRESSURE: 83 MMHG

## 2024-06-20 PROCEDURE — 6370000000 HC RX 637 (ALT 250 FOR IP): Performed by: PSYCHIATRY & NEUROLOGY

## 2024-06-20 RX ADMIN — GABAPENTIN 300 MG: 300 CAPSULE ORAL at 08:00

## 2024-06-20 RX ADMIN — PANTOPRAZOLE SODIUM 40 MG: 40 TABLET, DELAYED RELEASE ORAL at 08:00

## 2024-06-20 NOTE — BH NOTE
.tob  
[facility]  Discharge Note    Pt discharged with followings belongings:   Dental Appliances: None  Vision - Corrective Lenses: Contact Lenses  Hearing Aid: None  Jewelry: None  Body Piercings Removed: N/A  Clothing: Shorts, Footwear, Shirt  Other Valuables: Wallet, Credit/Debit Card   Valuables sent home with patient or returned to patient. Patient educated on aftercare instructions: YES  Information faxed to [facility] by staff  at 8:27 AM .Patient verbalize understanding of AVS:  YES.    Status EXAM upon discharge:  Mental Status and Behavioral Exam  Normal: No  Level of Assistance: Independent/Self  Facial Expression: Flat  Affect: Appropriate  Level of Consciousness: Alert  Frequency of Checks: 4 times per hour, close  Mood:Normal: No  Mood: Anxious  Motor Activity:Normal: Yes  Motor Activity: Other (comment)  Eye Contact: Good  Observed Behavior: Cooperative  Sexual Misconduct History: Current - no  Preception: [place] to person, [place] to time, [place] to place  Attention:Normal: No  Attention: Distractible  Thought Processes: Circumstantial  Thought Content:Normal: Yes  Thought Content: Poverty of content  Depression Symptoms: No problems reported or observed.  Anxiety Symptoms: Generalized  Teri Symptoms: No problems reported or observed.  Hallucinations: None  Delusions: No  Memory:Normal: Yes  Memory: Poor recent  Insight and Judgment: No  Insight and Judgment: Poor judgment, Poor insight    Tobacco Screening:  Practical Counseling, on admission, juan jose X, if applicable and completed (first 3 are required if patient doesn't refuse):            ( x) Recognizing danger situations (included triggers and roadblocks)                    ( x) Coping skills (new ways to manage stress,relaxation techniques, changing routine, distraction)                                                           ( ) Basic information about quitting (benefits of quitting, techniques in how to quit, available 
No OQ available.  
On call provider, notified of best practice advisory suggesting to place patient on suicide precautions. Provider to discontinue the order as patient does not meet criteria for suicide precautions at this time. Continue to observe patient on every 15 minute checks.   
Patient arrived from [facility] on stretcher via EMS with belongings.   
Patient is a former cigarette smoker.  
Patient states he is no longer smoker no smoking cessation needed at this time.  
Patient states that he has an appointment at [facility] tomorrow and needs to be there by 10 am at the latest. Patient states that if he is there earlier than 10 am that is okay he just doesn't want to be late, but is fine with being early. Writer told patient that she would pass the message along to the oncoming staff in the morning to make them aware.   
Pt participated in safety checks. No contraband found.  
previous data.)  Thought Content: Preoccupations  Depression Symptoms: Feelings of helplessness, Feelings of hopelessess (Simultaneous filing. User may not have seen previous data.)  Anxiety Symptoms: Generalized (Simultaneous filing. User may not have seen previous data.)  Teri Symptoms: No problems reported or observed. (Simultaneous filing. User may not have seen previous data.)  Hallucinations: None (Simultaneous filing. User may not have seen previous data.)  Delusions: No (Simultaneous filing. User may not have seen previous data.)  Memory:Normal: No (Simultaneous filing. User may not have seen previous data.)  Insight and Judgment: No (Simultaneous filing. User may not have seen previous data.)  Insight and Judgment: Poor judgment, Poor insight    Tobacco Screening:  Practical Counseling, on admission, juan jose X, if applicable and completed (first 3 are required if patient doesn't refuse):            ( ) Recognizing danger situations (included triggers and roadblocks)                    ( ) Coping skills (new ways to manage stress,relaxation techniques, changing routine, distraction)                                                           ( ) Basic information about quitting (benefits of quitting, techniques in how to quit, available resources  ( ) [facility]                                                                                                                     ( ) Patient refused counseling  (X) Patient has not smoked in the last 30 days    Metabolic Screening:    Lab Results   Component Value Date    LABA1C 5.0 07/09/2018       No results found for: \"CHOL\"  No results found for: \"TRIG\"  Lab Results   Component Value Date    HDL 20 (L) 07/16/2018     No components found for: \"LDLCAL\"  No components found for: \"LABVLDL\"      Body mass index is 30.34 kg/m².    BP Readings from Last 2 Encounters:   06/14/24 139/79   06/14/24 126/71     The patient is

## 2024-06-20 NOTE — TRANSITION OF CARE
delayed release capsule  Commonly known as: PRILOSEC  Notes to patient: Indigestion/Stomach health     Testosterone Cypionate 200 MG/ML Soln            STOP taking these medications      amphetamine-dextroamphetamine 15 MG extended release capsule  Commonly known as: ADDERALL XR     amphetamine-dextroamphetamine 20 MG tablet  Commonly known as: ADDERALL              Unresulted Labs (24h ago, onward)      None            To obtain results of studies pending at discharge, please contact     Follow-up Information       Follow up With Specialties Details Why Contact [facility]  Follow up on 6/20/2024 You have a scheduled medication management appointment for Thursday June 20th at 11:00am at the [facility] [address] office.     Please send patient directly to [facility] by [facility] [phone] [place] [address]  [phone]  fax [phone]             Advanced Directive:   Does the patient have an appointed surrogate decision maker? No  Does the patient have a Medical Advance Directive? No  Does the patient have a Psychiatric Advance Directive? No  If the patient does not have a surrogate or Medical Advance Directive AND Psychiatric Advance Directive, the patient was offered information on these advance directives Patient declined to complete    Patient Instructions: Please continue all medications until otherwise directed by physician.  Dr Pedroza    Tobacco Cessation Discharge Plan:   Is the patient a tobacco user  and needs referral for tobacco cessation? No  Patient referred to the following for tobacco cessation with an appointment? No  Patient was offered medication to assist with tobacco cessation at discharge? Patient refused    Alcohol/Substance Abuse Discharge Plan:   Does the patient have a history of substance/alcohol abuse and requires a referral for treatment? Yes  Patient referred to the following for substance/alcohol abuse

## 2024-06-20 NOTE — PROGRESS NOTES
Behavioral Services  Medicare Certification Upon Admission    I certify that this patient's inpatient psychiatric hospital admission is medically necessary for:    [x] (1) Treatment which could reasonably be expected to improve this patient's condition,       [x] (2) Or for diagnostic study;     AND     [x](2) The inpatient psychiatric services are provided while the individual is under the care of a physician and are included in the individualized plan of care.    Estimated length of stay/service 3-5 days    Plan for post-hospital care hc    Electronically signed by Quita Solorzano MD on 6/15/2024 at 5:14 PM      
    [facility] Internal Medicine  Francisco Gregg MD; Jerome Mayorga MD, Pedro Pablo Coulter MD, Cynthia Wolff MD, Lon Almanzar MD; Malik Cash MD    [facility] Internal Medicine   IN-PATIENT SERVICE   [facility]     HISTORY AND PHYSICAL EXAMINATION            Date:   6/19/2024  Patient name:  Calixto Dobbins  Date of admission:  6/14/2024  6:48 PM  MRN:   [id number]  Account:  [id number]  YOB: 1990  PCP:    None, None  Room:   [address] [place] [address]  Code Status:    Full Code      Chief Complaint:     Mental health disorder    History Obtained From:     Patient/EMR/bedside RN     History of Present Illness:     34-year-old gentleman class I obese BMI is 30 history of depression street drug abuse admitted for mental health disorder denies any fever chills nausea vomiting no chest pain no dyspnea  Denies any history of HIV tuberculosis no significant weight loss or weight gain    Past Medical History:     Past Medical History:   Diagnosis Date    ADD (attention deficit disorder)     not sure of diag, was on adderall in college, then approx 6 mos util 3/2017    Cocaine abuse (HCC)     Concussion     Depressive disorder     was put on med at time hospitalization, [facility] psych, took celexa x sev wks only.     Murmur         Past Surgical History:     No past surgical history on file.     Medications Prior to Admission:     Prior to Admission medications    Medication Sig Start Date End Date Taking? Authorizing Provider   omeprazole (PRILOSEC) 20 MG delayed release capsule Take 2 capsules by mouth daily   Yes ProviderBriseyda MD   gabapentin (NEURONTIN) 300 MG capsule Take 1 capsule by mouth in the morning and 1 capsule in the evening.    ProviderBriseyda MD   Testosterone Cypionate 200 MG/ML SOLN Inject 300 mg into the muscle once a week Indications: Mondays    ProviderBriseyda MD        Allergies:     Patient has no known allergies.    Social 
  Daily Progress Note  6/16/2024    Patient Name: Calixto Dobbins    CHIEF COMPLAINT: Suicidal ideation         SUBJECTIVE:      Patient seen face-to-face for follow-up assessment.  He is resting in bed but is arousable to verbal stimuli.  Thought processes linear and goal-directed.  Patient reports mood is \"fine\".  He identifies his suicidal thoughts is less severe, but is still feeling quite hopeless regarding his living situation.  Patient was kicked out of his mother's house and states that he is not able to return.  He is interested in going to [facility], but was under the impression that there were no available beds.  Patient plans to reach out to various shelters for placement.  Patient does have a history of polysubstance abuse, but  denies any recreational or prescribed medication abuse.  He is quite focused on starting his stimulant.  However, patient has long history of stimulant abuse and urine toxicology was negative for any amphetamines, despite being prescribed Adderall in the community.    Patient was started on sertraline 25 mg to help with mood.  He denies any specific side effects, but states he does not like taking anything besides his Adderall and gabapentin.  Did explain that first-line treatment for depression and anxiety is SSRI, but patient is indifferent to education.  He disengages from assessment and is minimally interactive following discussion about his stimulant.  Staff report that patient has been able to maintain behavioral control.  He was agitated overnight and yelled at staff, but was able to be redirected.  Today, patient has been isolative to his room and has not been attending group programming.    Patient has yet to demonstrate stability.   requires continued inpatient hospitalization for safety.    Appetite:  [x] Adequate/Unchanged  [] Increased  [] Decreased      Sleep:       [] Adequate/Unchanged  [x] Fair  [] Poor      Group Attendance on Unit:   [] Yes   [] 
  Daily Progress Note  6/17/2024    Patient Name: Calixto Dobbins    CHIEF COMPLAINT:  Suicidal Ideation          SUBJECTIVE:      Patient is seen today for a follow up assessment. Pt seems to be a little on edge/hyperactive. He states that he is doing better but needs to get in touch with his mom, which would help him to feel more positive. Pt states that getting in touch with his mom will help him with getting new clothes, refilling his meds, etc. Pt relays that he has not been sleeping the best. He is requesting aderall which he says he has been taking for years and helps him to calm down. He has stopped taking Zoloft because it causes him to have \"racing thoughts\". However, he still is experiencing depressive sx of hopelessness. Pt denies SI/HI or visual/auditory hallucinations.     Pt reports good appetite but sleep has been so-so.     Appetite:  [x] Adequate/Unchanged  [] Increased  [] Decreased      Sleep:       [] Adequate/Unchanged  [x] Fair  [] Poor      Group Attendance on Unit:   [] Yes   [] Selectively    [x] No    Compliant with scheduled medications: [x] Yes  [] No    Received emergency medications in past 24 hrs: [] Yes   [x] No    Medication Side Effects: Denies         Mental Status Exam  Level of consciousness: Alert and awake   Appearance: Appropriate attire for setting, seated in chair, with fair  grooming and hygiene   Behavior/Motor: Approachable, engages with interviewer, no psychomotor abnormalities   Attitude toward examiner: Cooperative, attentive, good eye contact  Speech: spontaneous, normal rate, normal volume, and well articulated   Mood:  \"better\"  Affect: pt seems irritated and on edge  Thought processes: linear, goal directed, and coherent   Thought content:  Denies homicidal ideation  Suicidal Ideation: Denies suicidal ideations, contracts for safety on the unit.   Delusions: No evidence of delusions.   Perceptual Disturbance:  Patient not to be responding to internal stimuli. 
  Daily Progress Note  6/19/2024    Patient Name: Calixto Dobbins    CHIEF COMPLAINT:  Suicidal Ideation          SUBJECTIVE:   The patient was seen face-to-face.  He is feeling little better.  He continues to be dysphoric.  He wants to go to a shelter upon discharge.  He has been feeling tired and fatigued.  He has been sleeping.  He denies suicidal ideation.  The patient expected length of stay is 1 day      Appetite:  [x] Adequate/Unchanged  [] Increased  [] Decreased      Sleep:       [] Adequate/Unchanged  [x] Fair  [] Poor      Group Attendance on Unit:   [] Yes   [] Selectively    [x] No    Compliant with scheduled medications: [x] Yes  [] No    Received emergency medications in past 24 hrs: [] Yes   [x] No    Medication Side Effects: Denies         Mental Status Exam  Level of consciousness: Alert and awake   Appearance: Appropriate attire for setting, seated in chair, with fair  grooming and hygiene   Behavior/Motor: Approachable, engages with interviewer, no psychomotor abnormalities   Attitude toward examiner: Cooperative, attentive, good eye contact  Speech: spontaneous, normal rate, normal volume, and well articulated   Mood: Dysphoric  Affect: Mood congruent  Thought processes: linear, goal directed, and coherent   Thought content:  Denies homicidal ideation  Suicidal Ideation: Denies suicidal ideations, contracts for safety on the unit.   Delusions: No evidence of delusions.   Perceptual Disturbance:  Patient not to be responding to internal stimuli.   Cognition: Oriented to self, location, time, and situation  Memory: intact  Insight: fair   Judgement: fair       Data   height is 1.854 m (6' 1\") and weight is 104.3 kg (230 lb). His temporal temperature is 97.8 °F (36.6 °C). His blood pressure is 131/65 and his pulse is 66. His respiration is 14 and oxygen saturation is 98%.   Labs:   No visits with results within 2 Day(s) from this visit.   Latest known visit with results is:   Admission on 
RT ASSESSMENT TREATMENT GOALS    [x]Pt Goal:  Pt will identify 1-2 positive coping skills by time of discharge.    [x]Pt Goal:  Pt will identify 1-2 positive aspects of self by time of discharge.    []Pt Goal:  Pt will remain on task/topic for 15-30 minutes during group by time of discharge.    []Pt Goal:  Pt will identify 1-2 aspects of relapse prevention plan by time of discharge.    []Pt Goal:  Pt will join in conversation with peers 1-2 times per group by time of discharge.    []Pt Goal:  Pt will identify 1-2 new leisure interests by time of discharge.    []Pt Goal: Pt will maintain behavorial control until the time of discharge.     
(NEURONTIN) 300 MG capsule Take 1 capsule by mouth in the morning and 1 capsule in the evening.    Provider, MD Briseyda   Testosterone Cypionate 200 MG/ML SOLN Inject 300 mg into the muscle once a week Indications:     Provider, MD Briseyda        Allergies:     Patient has no known allergies.    Social History:     Tobacco:    reports that he has quit smoking. He has a 0.8 pack-year smoking history. He has never used smokeless tobacco.  Alcohol:      reports that he does not currently use alcohol.  Drug Use:  reports current drug use. Drug: Cocaine.    Family History:     Family History   Problem Relation Age of Onset    Cancer Father         bone        Review of Systems:     Positive and Negative as described in HPI.    CONSTITUTIONAL:  negative for fevers, chills, sweats, fatigue, weight loss  HEENT:  negative for vision, hearing changes, runny nose, throat pain  RESPIRATORY:  negative for shortness of breath, cough, congestion, wheezing.  CARDIOVASCULAR:  negative for chest pain, palpitations.  GASTROINTESTINAL:  negative for nausea, vomiting, diarrhea, constipation, change in bowel habits, abdominal pain   GENITOURINARY:  negative for difficulty of urination, burning with urination, frequency   INTEGUMENT:  negative for rash, skin lesions, easy bruising   HEMATOLOGIC/LYMPHATIC:  negative for swelling/edema   ALLERGIC/IMMUNOLOGIC:  negative for urticaria , itching  ENDOCRINE:  negative increase in drinking, increase in urination, hot or cold intolerance  MUSCULOSKELETAL:  negative joint pains, muscle aches, swelling of joints  NEUROLOGICAL:  negative for headaches, dizziness, lightheadedness, numbness, pain, tingling extremities      Physical Exam:     /84   Pulse 75   Temp 97.3 °F (36.3 °C) (Temporal)   Resp 16   Ht 1.854 m (6' 1\")   Wt 104.3 kg (230 lb)   SpO2 98%   BMI 30.34 kg/m²   Temp (24hrs), Av.3 °F (36.3 °C), Min:97.3 °F (36.3 °C), Max:97.3 °F (36.3 °C)    No results for 
for input(s): \"POCGLU\" in the last 72 hours.  No intake or output data in the 24 hours ending 06/17/24 1500    General Appearance:  alert, well appearing, and in no acute distress  Mental status: oriented to person, place, and time   Head:  normocephalic, atraumatic.  Neck: supple, no carotid bruits, thyroid not palpable  Lungs: Bilateral equal air entry, clear to ausculation, no wheezing, rales or rhonchi, normal effort  Cardiovascular: normal rate, regular rhythm, no murmur, gallop, rub.  Abdomen: Soft, nontender, nondistended, normal bowel sounds, no hepatomegaly or splenomegaly  Neurologic: There are no new focal motor or sensory deficits, moving all extremities spontaneously   Skin: No gross lesions, rashes, bruising or bleeding on exposed skin area  Extremities:  peripheral pulses palpable, no pedal edema or calf pain with palpation    Investigations:      Laboratory Testing:  No results found for this or any previous visit (from the past 24 hour(s)).    Imaging/Diagonstics:  No results found.    Assessment :      Hospital Problems             Last Modified POA    * (Principal) Major depressive disorder, recurrent severe without psychotic features (HCC) 6/15/2024 Yes     Plan:     Admitted to inpatient psych  34-year-old gentleman class I obese BMI is 30 history of depression street drug abuse admitted for mental health disorder denies any fever chills nausea vomiting no chest pain no dyspnea  Denies any history of HIV tuberculosis no significant weight loss or weight gain  Labs vitals reviewed agree with above treatment  DVT prophylaxis,  Pt mobile   Full code status       Consultations:   IP CONSULT TO INTERNAL MEDICINE      Francisco Gregg MD  6/17/2024  3:00 PM    Copy sent to Dr. Trivedi, None    Please note that this chart was generated using voice recognition Dragon dictation software.  Although every effort was made to ensure the accuracy of this automated transcription, some errors in transcription may 
psychiatric personnel.    Electronically signed by STANTON GALLAGHER MD on 6/18/2024 at 5:10 PM    **This report has been created using voice recognition software. It may contain minor errors which are inherent in voice recognition technology.**

## 2024-06-20 NOTE — GROUP NOTE
Group Therapy Note    Date: 6/19/2024    Group Start Time: 2000  Group End Time: 2130  Group Topic: Relaxation    Patricia Jolly        Group Therapy Note    Attendees: 8/17       Modes of Intervention: Support, Socialization, and Media      Discipline Responsible: Behavorial Health Tech      Signature:  Patricia Morrison

## 2024-06-20 NOTE — PLAN OF CARE
Problem: Behavior  Goal: Pt/Family maintain appropriate behavior and adhere to behavioral management agreement, if implemented  Description: INTERVENTIONS:  1. Assess patient/family's coping skills and  non-compliant behavior (including use of illegal substances)  2. Notify security of behavior or suspected illegal substances which indicate the need for search of the family and/or belongings  3. Encourage verbalization of thoughts and concerns in a socially appropriate manner  4. Utilize positive, consistent limit setting strategies supporting safety of patient, staff and others  5. Encourage participation in the decision making process about the behavioral management agreement  6. If a visitor's behavior poses a threat to safety call refer to organization policy.  7. Initiate consult with , Psychosocial CNS, Spiritual Care as appropriate  Outcome: Progressing     Problem: Anxiety  Goal: Will report anxiety at manageable levels  Description: INTERVENTIONS:  1. Administer medication as ordered  2. Teach and rehearse alternative coping skills  3. Provide emotional support with 1:1 interaction with staff  Outcome: Progressing     Problem: Depression  Goal: Will be euthymic at discharge  Description: INTERVENTIONS:  1. Administer medication as ordered  2. Provide emotional support via 1:1 interaction with staff  3. Encourage involvement in milieu/groups/activities  4. Monitor for social isolation  Outcome: Progressing     Problem: Self Harm/Suicidality  Goal: Will have no self-injury during hospital stay  Description: INTERVENTIONS:  1.  Ensure constant observer at bedside with Q15M safety checks  2.  Maintain a safe environment  3.  Secure patient belongings  4.  Ensure family/visitors adhere to safety recommendations  5.  Ensure safety tray has been added to patient's diet order  6.  Every shift and PRN: Re-assess suicidal risk via Frequent Screener    Outcome: Progressing  Note: Patient denies 
  Problem: Depression  Goal: Will be euthymic at discharge  Description: INTERVENTIONS:  1. Administer medication as ordered  2. Provide emotional support via 1:1 interaction with staff  3. Encourage involvement in milieu/groups/activities  4. Monitor for social isolation  6/18/2024 1206 by Dasia Gao LPN  Outcome: Progressing    Problem: Anxiety  Goal: Will report anxiety at manageable levels  Description: INTERVENTIONS:  1. Administer medication as ordered  2. Teach and rehearse alternative coping skills  3. Provide emotional support with 1:1 interaction with staff  6/18/2024 1206 by Dasia Gao LPN  Outcome: Progressing       Problem: Self Harm/Suicidality  Goal: Will have no self-injury during hospital stay  Description: INTERVENTIONS:  1.  Ensure constant observer at bedside with Q15M safety checks  2.  Maintain a safe environment  3.  Secure patient belongings  4.  Ensure family/visitors adhere to safety recommendations  5.  Ensure safety tray has been added to patient's diet order  6.  Every shift and PRN: Re-assess suicidal risk via Frequent Screener    6/18/2024 1206 by Dasia Gao LPN  Outcome: Progressing, Patient pleasant and cooperative. Voiced feeling anxious due possible being discharged soon. Patient encouraged to seek staff and social work for 1;1 talk time.  Anxiety medication offered then given as ordered. Patient denied suicidal and homicidal ideations. 100 % meal and fluid intake.       
  Problem: Depression  Goal: Will be euthymic at discharge  Description: INTERVENTIONS:  1. Administer medication as ordered  2. Provide emotional support via 1:1 interaction with staff  3. Encourage involvement in milieu/groups/activities  4. Monitor for social isolation  Outcome: Progressing     Problem: Self Harm/Suicidality  Goal: Will have no self-injury during hospital stay  Description: INTERVENTIONS:  1.  Ensure constant observer at bedside with Q15M safety checks  2.  Maintain a safe environment  3.  Secure patient belongings  4.  Ensure family/visitors adhere to safety recommendations  5.  Ensure safety tray has been added to patient's diet order  6.  Every shift and PRN: Re-assess suicidal risk via Frequent Screener    Outcome: Progressing, patient cooperative; affect flat with sad mood today. Patient  reports anxiousness  related to discharging tomorrow. Patient encouraged to continue his medication regimen. Patient denies self harm. 100 % meal and fluid intake. No concerns voiced of not sleeping during the night.     
  Problem: Self Harm/Suicidality  Goal: Will have no self-injury during hospital stay  Description: INTERVENTIONS:  1.  Ensure constant observer at bedside with Q15M safety checks  2.  Maintain a safe environment  3.  Secure patient belongings  4.  Ensure family/visitors adhere to safety recommendations  5.  Ensure safety tray has been added to patient's diet order  6.  Every shift and PRN: Re-assess suicidal risk via Frequent Screener    6/15/2024 1737 by Essex, Matthew, RN  Outcome: Progressing  Note: Pt remains free of self inflicted injury.     Problem: Behavior  Goal: Pt/Family maintain appropriate behavior and adhere to behavioral management agreement, if implemented  Description: INTERVENTIONS:  1. Assess patient/family's coping skills and  non-compliant behavior (including use of illegal substances)  2. Notify security of behavior or suspected illegal substances which indicate the need for search of the family and/or belongings  3. Encourage verbalization of thoughts and concerns in a socially appropriate manner  4. Utilize positive, consistent limit setting strategies supporting safety of patient, staff and others  5. Encourage participation in the decision making process about the behavioral management agreement  6. If a visitor's behavior poses a threat to safety call refer to organization policy.  7. Initiate consult with , Psychosocial CNS, Spiritual Care as appropriate  6/15/2024 1737 by Essex, Matthew, RN  Outcome: Progressing  Note: Pt denies having suicidal or homicidal ideations. Pt reports feeling anxious and depressed. Pt has been focused on getting his neurotin and testosterone restarted. Pt was irritable with staff at times, but was able to redirect himself. Pt has been compliant with medical treatment.     
  Problem: Self Harm/Suicidality  Goal: Will have no self-injury during hospital stay  Description: INTERVENTIONS:  1.  Ensure constant observer at bedside with Q15M safety checks  2.  Maintain a safe environment  3.  Secure patient belongings  4.  Ensure family/visitors adhere to safety recommendations  5.  Ensure safety tray has been added to patient's diet order  6.  Every shift and PRN: Re-assess suicidal risk via Frequent Screener    6/17/2024 2355 by Patricia Morrison  Outcome: Progressing     Problem: Depression  Goal: Will be euthymic at discharge  Description: INTERVENTIONS:  1. Administer medication as ordered  2. Provide emotional support via 1:1 interaction with staff  3. Encourage involvement in milieu/groups/activities  4. Monitor for social isolation  6/17/2024 2355 by Patricia Morrison  Outcome: Progressing     Problem: Behavior  Goal: Pt/Family maintain appropriate behavior and adhere to behavioral management agreement, if implemented  Description: INTERVENTIONS:  1. Assess patient/family's coping skills and  non-compliant behavior (including use of illegal substances)  2. Notify security of behavior or suspected illegal substances which indicate the need for search of the family and/or belongings  3. Encourage verbalization of thoughts and concerns in a socially appropriate manner  4. Utilize positive, consistent limit setting strategies supporting safety of patient, staff and others  5. Encourage participation in the decision making process about the behavioral management agreement  6. If a visitor's behavior poses a threat to safety call refer to organization policy.  7. Initiate consult with , Psychosocial CNS, Spiritual Care as appropriate  6/17/2024 2355 by Patricia Morrison  Outcome: Progressing     Problem: Anxiety  Goal: Will report anxiety at manageable levels  Description: INTERVENTIONS:  1. Administer medication as ordered  2. Teach and rehearse alternative coping skills  3. Provide 
  Problem: Self Harm/Suicidality  Goal: Will have no self-injury during hospital stay  Description: INTERVENTIONS:  1.  Ensure constant observer at bedside with Q15M safety checks  2.  Maintain a safe environment  3.  Secure patient belongings  4.  Ensure family/visitors adhere to safety recommendations  5.  Ensure safety tray has been added to patient's diet order  6.  Every shift and PRN: Re-assess suicidal risk via Frequent Screener    6/18/2024 2128 by Patricia Morrison  Outcome: Progressing  Pt denies any thoughts of self harm at this time. Pt is free from self harm and self harm injury.         Problem: Depression  Goal: Will be euthymic at discharge  Description: INTERVENTIONS:  1. Administer medication as ordered  2. Provide emotional support via 1:1 interaction with staff  3. Encourage involvement in milieu/groups/activities  4. Monitor for social isolation  6/18/2024 2128 by Patricia Morrison  Outcome: Progressing  Pt denies any depression at this time.     Problem: Behavior  Goal: Pt/Family maintain appropriate behavior and adhere to behavioral management agreement, if implemented  Description: INTERVENTIONS:  1. Assess patient/family's coping skills and  non-compliant behavior (including use of illegal substances)  2. Notify security of behavior or suspected illegal substances which indicate the need for search of the family and/or belongings  3. Encourage verbalization of thoughts and concerns in a socially appropriate manner  4. Utilize positive, consistent limit setting strategies supporting safety of patient, staff and others  5. Encourage participation in the decision making process about the behavioral management agreement  6. If a visitor's behavior poses a threat to safety call refer to organization policy.  7. Initiate consult with , Psychosocial CNS, Spiritual Care as appropriate  6/18/2024 2128 by Patricia Morrison  Outcome: Progressing  Pt has been behavior controlled and complaint with 
[facility]  Day 3 Interdisciplinary Treatment Plan NOTE    Review Date & Time: 6/17/2024 1245    Admission Type:   Admission Type: Voluntary    Reason for admission:  Reason for Admission: Depression with suicidal ideations related to losing housing with mom and now being homeless. Patient has treaspassing charges for going back to his mom's house.  Estimated Length of Stay: 5-7 days  Estimated Discharge Date Update: to be determined by physician    PATIENT STRENGTHS:  Patient Strengths    Patient Strengths and Limitations:Limitations: Multiple barriers to leisure interests, Inappropriate/potentially harmful leisure interests, External locus of control  Addictive Behavior:Addictive Behavior  In the Past 3 Months, Have You Felt or Has Someone Told You That You Have a Problem With  : None  Medical Problems:  Past Medical History:   Diagnosis Date    ADD (attention deficit disorder)     not sure of diag, was on adderall in college, then approx 6 mos util 3/2017    Cocaine abuse (HCC)     Concussion     Depressive disorder     was put on med at time hospitalization, [facility], took celexa x sev wks only.     Murmur        Risk:  Fall Risk   Pete Scale Pete Scale Score: 22  BVC    Change in scores no Changes to plan of Care no    Status EXAM:   Mental Status and Behavioral Exam  Normal: No  Level of Assistance: Independent/Self  Facial Expression: Flat  Affect: Blunt, Stable, Appropriate  Level of Consciousness: Alert  Frequency of Checks: 4 times per hour, close  Mood:Normal: No  Mood: Anxious, Depressed  Motor Activity:Normal: No  Motor Activity: Decreased  Eye Contact: Fair  Observed Behavior: Cooperative, Guarded, Withdrawn  Sexual Misconduct History: Current - no  Preception: [place] to person, [place] to time, [place] to place, [place] to situation  Attention:Normal: Yes  Thought Processes: Unremarkable  Thought Content:Normal: No  Thought Content: Preoccupations  Depression Symptoms: Feelings of 
[facility]  Initial Interdisciplinary Treatment Plan Note      Original treatment plan Date & Time: 6/15/2024   1245    Admission Type:  Admission Type: Voluntary    Reason for admission:   Reason for Admission: Depression with suicidal ideations related to losing housing with mom and now being homeless. Patient has treaspassing charges for going back to his mom's house.    Estimated Length of Stay:  5-7days  Estimated Discharge Date: To be determined by physician.    PATIENT STRENGTHS:  Patient Strengths:   Patient Strengths and Limitations:   Addictive Behavior: Addictive Behavior  In the Past 3 Months, Have You Felt or Has Someone Told You That You Have a Problem With  : None  Medical Problems:  Past Medical History:   Diagnosis Date    ADD (attention deficit disorder)     not sure of diag, was on adderall in college, then approx 6 mos util 3/2017    Cocaine abuse (HCC)     Concussion     Depressive disorder     was put on med at time hospitalization, [facility] psych, took celexa x sev wks only.     Murmur      Status EXAM:Mental Status and Behavioral Exam  Normal: Yes  Level of Assistance: Independent/Self  Facial Expression: Flat, Worried  Affect: Stable, Appropriate (Simultaneous filing. User may not have seen previous data.)  Level of Consciousness: Alert (Simultaneous filing. User may not have seen previous data.)  Frequency of Checks: 4 times per hour, close (Simultaneous filing. User may not have seen previous data.)  Mood:Normal: Yes (Simultaneous filing. User may not have seen previous data.)  Mood: Anxious, Helpless  Motor Activity:Normal: Yes  Eye Contact: Fair (Simultaneous filing. User may not have seen previous data.)  Observed Behavior: Friendly (Simultaneous filing. User may not have seen previous data.)  Sexual Misconduct History: Current - no (Simultaneous filing. User may not have seen previous data.)  Preception: [place] to person, [place] to time, [place] to place, [place] to 
Patient denies suicidal/homicidal ideations, denies hallucinations. Flat affect, guarded during assessment, isolative to room. Denies acute physical concerns. Will continue to monitor for safety and changes in mental status while admitted.    Problem: Self Harm/Suicidality  Goal: Will have no self-injury during hospital stay  Description: INTERVENTIONS:  1.  Ensure constant observer at bedside with Q15M safety checks  2.  Maintain a safe environment  3.  Secure patient belongings  4.  Ensure family/visitors adhere to safety recommendations  5.  Ensure safety tray has been added to patient's diet order  6.  Every shift and PRN: Re-assess suicidal risk via Frequent Screener    6/16/2024 0019 by Yordan Edmondson, RN  Outcome: Progressing     Problem: Depression  Goal: Will be euthymic at discharge  Description: INTERVENTIONS:  1. Administer medication as ordered  2. Provide emotional support via 1:1 interaction with staff  3. Encourage involvement in milieu/groups/activities  4. Monitor for social isolation  6/16/2024 0019 by Yordan Edmondson, RN  Outcome: Progressing     Problem: Behavior  Goal: Pt/Family maintain appropriate behavior and adhere to behavioral management agreement, if implemented  Description: INTERVENTIONS:  1. Assess patient/family's coping skills and  non-compliant behavior (including use of illegal substances)  2. Notify security of behavior or suspected illegal substances which indicate the need for search of the family and/or belongings  3. Encourage verbalization of thoughts and concerns in a socially appropriate manner  4. Utilize positive, consistent limit setting strategies supporting safety of patient, staff and others  5. Encourage participation in the decision making process about the behavioral management agreement  6. If a visitor's behavior poses a threat to safety call refer to organization policy.  7. Initiate consult with , Psychosocial CNS, Spiritual Care as 
endorses some symptoms of anxiety and depression but states they are improving at this time.  Patient is discharge focused and brightened when discussing leaving. Patient is medication compliant and behavior remains controlled at this time.  Safe environment provided. Q15 minute checks maintained.

## 2024-06-20 NOTE — CARE COORDINATION
Name: Calixto Dobbins    : 1990    Auth number: [id number]     Discharge Date: 24    Destination: Patient discharged to [place] for follow up appointment via cab     Discharge Medications:      Medication List        CONTINUE taking these medications      gabapentin 300 MG capsule  Commonly known as: NEURONTIN  Notes to patient: Nerve pain     omeprazole 20 MG delayed release capsule  Commonly known as: PRILOSEC  Notes to patient: Indigestion/Stomach health     Testosterone Cypionate 200 MG/ML Soln  Notes to patient: Testosterone treatment            STOP taking these medications      amphetamine-dextroamphetamine 15 MG extended release capsule  Commonly known as: ADDERALL XR     amphetamine-dextroamphetamine 20 MG tablet  Commonly known as: ADDERALL              Follow Up Appointment: [facility] [address] [place] [address]  [phone]  fax [phone]  Follow up on 2024  You have a scheduled medication management appointment for  at 11:00am at the [address] office.     Please send patient directly to [place] appt by [facility] cab [phone]

## 2024-06-20 NOTE — DISCHARGE INSTR - OTHER ORDERS
Attend follow up appointments, take medications as prescribed, do not use illicit drugs or alcohol

## 2024-06-20 NOTE — DISCHARGE INSTRUCTIONS
Information:  Medications:   Medication summary provided   I understand that I should take only the medications on my list.     -why and when I need to take each medicine.     -which side effects to watch for.     -that I should carry my medication information at all times in case of     Emergency situations.    I will take all of my medicines to follow up appointments.     -check with my physician or pharmacist before taking any new    Medication, over the counter product or drink alcohol.    -Ask about food, drug or dietary supplement interactions.    -discard old lists and update records with medication providers.    Notify Physician:  Notify physician if you notice:   Always call 911 if you feel your life is in danger  In case of an emergency call 911 immediately!  If 911 is not available call your local emergency medical system for help    Behavioral Health Follow Up:  Original Referral Source: [facility]  Discharge Diagnosis: Depression with suicidal ideation [F32.A, R45.851]  Recommendations for Level of Care: Attend follow up appointments and take medications as prescribed  Patient status at discharge: Stable, alert & oriented  My hospital  was: [facility]  Aftercare plan faxed: Yes   -faxed by: Nurse   -date: 6.20.24   -time: 8:35 am  Prescriptions: See AVS    Smoking: Quit Smoking.   Call the [employer]'s smoking quitline at 6-467-80D-QUIT  Know the signs of a heart attack   If you have any of the following symptoms call 911 immediately, do not wait more    Than five minutes.    1. Pressure, fullness and/ or squeezing in the center of the chest spreading to    The jaw, neck or shoulder.    2. Chest discomfort with light headedness, fainting, sweating, nausea or    Shortness of breath.   3. Upper abdominal pressure or discomfort.   4. Lower chest pain, back pain, unusual fatigue, shortness of breath, nausea   Or dizziness.     General Information:   Questions regarding your bill: Call HELP program ([phone])